# Patient Record
Sex: FEMALE | Race: WHITE | Employment: OTHER | URBAN - METROPOLITAN AREA
[De-identification: names, ages, dates, MRNs, and addresses within clinical notes are randomized per-mention and may not be internally consistent; named-entity substitution may affect disease eponyms.]

---

## 2017-05-31 ENCOUNTER — HOSPITAL ENCOUNTER (EMERGENCY)
Age: 27
Discharge: HOME | End: 2017-05-31
Attending: EMERGENCY MEDICINE
Payer: COMMERCIAL

## 2017-05-31 ENCOUNTER — APPOINTMENT (OUTPATIENT)
Dept: CT IMAGING | Age: 27
End: 2017-05-31
Attending: EMERGENCY MEDICINE
Payer: COMMERCIAL

## 2017-05-31 VITALS
RESPIRATION RATE: 16 BRPM | HEART RATE: 107 BPM | BODY MASS INDEX: 25.34 KG/M2 | OXYGEN SATURATION: 100 % | SYSTOLIC BLOOD PRESSURE: 104 MMHG | DIASTOLIC BLOOD PRESSURE: 59 MMHG | WEIGHT: 143 LBS | TEMPERATURE: 98.1 F | HEIGHT: 63 IN

## 2017-05-31 DIAGNOSIS — G93.5 CHIARI I MALFORMATION (HCC): ICD-10-CM

## 2017-05-31 DIAGNOSIS — R51.9 HEADACHE, UNSPECIFIED HEADACHE TYPE: Primary | ICD-10-CM

## 2017-05-31 PROCEDURE — 99284 EMERGENCY DEPT VISIT MOD MDM: CPT | Performed by: EMERGENCY MEDICINE

## 2017-05-31 PROCEDURE — 74011250636 HC RX REV CODE- 250/636: Performed by: EMERGENCY MEDICINE

## 2017-05-31 PROCEDURE — 70450 CT HEAD/BRAIN W/O DYE: CPT

## 2017-05-31 PROCEDURE — 96374 THER/PROPH/DIAG INJ IV PUSH: CPT | Performed by: EMERGENCY MEDICINE

## 2017-05-31 PROCEDURE — 96375 TX/PRO/DX INJ NEW DRUG ADDON: CPT | Performed by: EMERGENCY MEDICINE

## 2017-05-31 RX ORDER — DIPHENHYDRAMINE HYDROCHLORIDE 50 MG/ML
50 INJECTION, SOLUTION INTRAMUSCULAR; INTRAVENOUS
Status: COMPLETED | OUTPATIENT
Start: 2017-05-31 | End: 2017-05-31

## 2017-05-31 RX ORDER — METOCLOPRAMIDE HYDROCHLORIDE 5 MG/ML
10 INJECTION INTRAMUSCULAR; INTRAVENOUS
Status: COMPLETED | OUTPATIENT
Start: 2017-05-31 | End: 2017-05-31

## 2017-05-31 RX ORDER — SODIUM CHLORIDE 0.9 % (FLUSH) 0.9 %
5-10 SYRINGE (ML) INJECTION AS NEEDED
Status: DISCONTINUED | OUTPATIENT
Start: 2017-05-31 | End: 2017-06-01 | Stop reason: HOSPADM

## 2017-05-31 RX ORDER — ONDANSETRON 2 MG/ML
4 INJECTION INTRAMUSCULAR; INTRAVENOUS
Status: COMPLETED | OUTPATIENT
Start: 2017-05-31 | End: 2017-05-31

## 2017-05-31 RX ORDER — SODIUM CHLORIDE 0.9 % (FLUSH) 0.9 %
5-10 SYRINGE (ML) INJECTION EVERY 8 HOURS
Status: DISCONTINUED | OUTPATIENT
Start: 2017-05-31 | End: 2017-06-01 | Stop reason: HOSPADM

## 2017-05-31 RX ORDER — KETOROLAC TROMETHAMINE 30 MG/ML
30 INJECTION, SOLUTION INTRAMUSCULAR; INTRAVENOUS
Status: COMPLETED | OUTPATIENT
Start: 2017-05-31 | End: 2017-05-31

## 2017-05-31 RX ADMIN — ONDANSETRON 4 MG: 2 INJECTION INTRAMUSCULAR; INTRAVENOUS at 20:12

## 2017-05-31 RX ADMIN — KETOROLAC TROMETHAMINE 30 MG: 30 INJECTION, SOLUTION INTRAMUSCULAR at 19:55

## 2017-05-31 RX ADMIN — DIPHENHYDRAMINE HYDROCHLORIDE 50 MG: 50 INJECTION, SOLUTION INTRAMUSCULAR; INTRAVENOUS at 19:57

## 2017-05-31 RX ADMIN — METOCLOPRAMIDE 10 MG: 5 INJECTION, SOLUTION INTRAMUSCULAR; INTRAVENOUS at 19:56

## 2017-05-31 NOTE — ED NOTES
Talked to Dr. Madina Kong and received verbal order for head CT repeated and confirmed. patient is refusing lab work to be performed at this time.

## 2017-05-31 NOTE — ED TRIAGE NOTES
Patient complaining of head and neck pain that started yesterday, has been off and on since brain surgeries June and July of 2016. Patient advises that she had first due to malformation and second to CSF fluid leak. Patient also with complaints of weakness. Patient advises that procedures were performed at [facility]. Patient with some complaints of nausea.  advises some slurred speech last night, clear at this time.

## 2017-06-01 NOTE — ED PROVIDER NOTES
HPI Comments: Patient with chronic headaches since Chiari I malformation surgery in July 2016. In August 2016 she had surgery for a CSF leak. Chronic intermittent headaches and pain management since that time. Worsening headache over the last couple of days. Denies any fever. Sees Dr. Meggan Merchant at [facility].    Patient is a 27 y.o. female presenting with headaches. The history is provided by the patient. Headache    This is a chronic problem. The current episode started more than 2 days ago. The problem occurs constantly. The problem has not changed since onset. The headache is aggravated by nausea. The quality of the pain is described as throbbing. Associated symptoms include nausea. Pertinent negatives include no fever, no syncope, no shortness of breath, no weakness, no visual change and no vomiting. She has tried nothing for the symptoms. Past Medical History:   Diagnosis Date    Anxiety     ASCUS with positive high risk HPV cervical 2014    Chiari I malformation ([address]        Past Surgical History:   Procedure Laterality Date    HX HEENT      mouth surgery    HX ORTHOPAEDIC      left hand    HX OTHER SURGICAL  6/10/16 & 7/04/16    Brain surgery    HX TONSIL AND ADENOIDECTOMY           Family History:   Problem Relation Age of Onset    Breast Cancer Maternal Grandmother     Heart Disease Maternal Grandmother     Heart Disease Maternal Grandfather     Diabetes Maternal Grandfather     Breast Cancer Paternal Grandmother     Ovarian Cancer Paternal Grandmother     Ovarian Cancer Other      great aunt?  Colon Cancer Neg Hx        Social History     Social History    Marital status:      Spouse name: N/A    Number of children: N/A    Years of education: N/A     Occupational History    Not on file.      Social History Main Topics    Smoking status: Never Smoker    Smokeless tobacco: Never Used    Alcohol use Yes      Comment: occasionally    Drug use: No    Sexual activity: Yes     Partners: Male     Birth control/ protection: None     Other Topics Concern    Not on file     Social History Narrative         ALLERGIES: Amoxicillin; Erythromycin; and Pcn [penicillins]    Review of Systems   Constitutional: Negative for chills and fever. HENT: Negative for congestion, rhinorrhea and sore throat. Eyes: Negative for photophobia and redness. Respiratory: Negative for cough and shortness of breath. Cardiovascular: Negative for chest pain, leg swelling and syncope. Gastrointestinal: Positive for nausea. Negative for abdominal pain, diarrhea and vomiting. Endocrine: Negative for polydipsia and polyuria. Genitourinary: Negative for dysuria. Musculoskeletal: Negative for back pain and myalgias. Neurological: Positive for headaches. Negative for weakness and numbness. Vitals:    05/31/17 1700 05/31/17 1916   BP: 104/59    Pulse: (!) 107    Resp: 16    Temp: 98.1 °F (36.7 °C)    SpO2: 100% 100%   Weight: 64.9 kg (143 lb)    Height: 5' 3\" (1.6 m)             Physical Exam   Constitutional: She is oriented to person, place, and time. She appears well-developed and well-nourished. Eyes: Conjunctivae are normal. Pupils are equal, round, and reactive to light. Neck: Normal range of motion. Neck supple. Cardiovascular: Normal rate, regular rhythm and normal heart sounds. No murmur heard. Pulmonary/Chest: Breath sounds normal. No respiratory distress. Abdominal: Soft. She exhibits no distension. There is no tenderness. There is no rebound and no guarding. Musculoskeletal: Normal range of motion. She exhibits no edema or tenderness. Neurological: She is alert and oriented to person, place, and time. She has normal strength. No cranial nerve deficit or sensory deficit. She exhibits normal muscle tone. Coordination normal. GCS eye subscore is 4. GCS verbal subscore is 5. GCS motor subscore is 6.    Reflex Scores:       Tricep reflexes are 2+ on the right side and 2+ on the left side. Bicep reflexes are 2+ on the right side and 2+ on the left side. Brachioradialis reflexes are 2+ on the right side and 2+ on the left side. Patellar reflexes are 2+ on the right side and 2+ on the left side. Skin: Skin is warm and dry. MDM  Number of Diagnoses or Management Options  Diagnosis management comments: Chronic intermittent headaches with history of Chiari I malformation. CT scan negative. We'll discharge home with instructions for increase fluids and caffeine use in case of CSF leak. Return to G at bedtime if worsening symptoms and follow-up with Dr. Laura Snider when called with appointment. Amount and/or Complexity of Data Reviewed  Discuss the patient with other providers: yes (Discussed with neurosurgery angry [facility].  They will have the patient called and work her in for a follow-up appointment with Dr. Laura Snider. )      ED Course       Procedures

## 2019-08-21 PROBLEM — E66.01 SEVERE OBESITY (HCC): Status: ACTIVE | Noted: 2019-08-21

## 2019-10-15 PROBLEM — Z15.01 BRCA1 GENE MUTATION POSITIVE: Status: ACTIVE | Noted: 2019-10-15

## 2019-10-15 PROBLEM — Z80.3 FAMILY HISTORY OF BREAST CANCER IN FEMALE: Status: ACTIVE | Noted: 2019-10-15

## 2019-10-15 PROBLEM — Z15.09 BRCA1 GENE MUTATION POSITIVE: Status: ACTIVE | Noted: 2019-10-15

## 2019-10-22 ENCOUNTER — HOSPITAL ENCOUNTER (OUTPATIENT)
Dept: MRI IMAGING | Age: 29
Discharge: HOME | End: 2019-10-22
Attending: OBSTETRICS & GYNECOLOGY
Payer: COMMERCIAL

## 2019-10-22 DIAGNOSIS — Z15.09 BRCA1 POSITIVE: ICD-10-CM

## 2019-10-22 DIAGNOSIS — Z80.3 FAMILY HISTORY OF BREAST CANCER: ICD-10-CM

## 2019-10-22 DIAGNOSIS — Z15.01 BRCA1 POSITIVE: ICD-10-CM

## 2019-10-22 DIAGNOSIS — Z80.0 FAMILY HISTORY OF PANCREATIC CANCER: ICD-10-CM

## 2019-10-22 PROCEDURE — 74011000258 HC RX REV CODE- 258: Performed by: OBSTETRICS & GYNECOLOGY

## 2019-10-22 PROCEDURE — 77049 MRI BREAST C-+ W/CAD BI: CPT

## 2019-10-22 PROCEDURE — A9575 INJ GADOTERATE MEGLUMI 0.1ML: HCPCS | Performed by: OBSTETRICS & GYNECOLOGY

## 2019-10-22 PROCEDURE — 74011250636 HC RX REV CODE- 250/636: Performed by: OBSTETRICS & GYNECOLOGY

## 2019-10-22 RX ORDER — GADOTERATE MEGLUMINE 376.9 MG/ML
17 INJECTION INTRAVENOUS
Status: COMPLETED | OUTPATIENT
Start: 2019-10-22 | End: 2019-10-22

## 2019-10-22 RX ORDER — SODIUM CHLORIDE 0.9 % (FLUSH) 0.9 %
10 SYRINGE (ML) INJECTION
Status: COMPLETED | OUTPATIENT
Start: 2019-10-22 | End: 2019-10-22

## 2019-10-22 RX ADMIN — Medication 10 ML: at 10:23

## 2019-10-22 RX ADMIN — SODIUM CHLORIDE 100 ML: 900 INJECTION, SOLUTION INTRAVENOUS at 10:23

## 2019-10-22 RX ADMIN — GADOTERATE MEGLUMINE: 376.9 INJECTION INTRAVENOUS at 10:23

## 2019-11-05 ENCOUNTER — HOSPITAL ENCOUNTER (OUTPATIENT)
Dept: MAMMOGRAPHY | Age: 29
Discharge: HOME | End: 2019-11-05
Payer: COMMERCIAL

## 2019-11-05 DIAGNOSIS — Z15.01 BRCA GENE POSITIVE: ICD-10-CM

## 2019-11-05 DIAGNOSIS — Z12.31 SCREENING MAMMOGRAM, ENCOUNTER FOR: ICD-10-CM

## 2019-11-05 DIAGNOSIS — Z15.09 BRCA GENE POSITIVE: ICD-10-CM

## 2019-11-05 PROCEDURE — 77063 BREAST TOMOSYNTHESIS BI: CPT

## 2019-11-06 ENCOUNTER — HOSPITAL ENCOUNTER (OUTPATIENT)
Dept: LAB | Age: 29
Discharge: HOME | End: 2019-11-06
Payer: COMMERCIAL

## 2019-11-06 DIAGNOSIS — N83.299 OVARIAN CYST, COMPLEX: ICD-10-CM

## 2019-11-06 LAB — CANCER AG125 SERPL-ACNC: 10 U/ML (ref 1.5–35)

## 2019-11-06 PROCEDURE — 36415 COLL VENOUS BLD VENIPUNCTURE: CPT

## 2019-11-06 PROCEDURE — 86304 IMMUNOASSAY TUMOR CA 125: CPT

## 2020-01-16 ENCOUNTER — HOSPITAL ENCOUNTER (OUTPATIENT)
Dept: GENERAL RADIOLOGY | Age: 30
Discharge: HOME | End: 2020-01-16
Payer: COMMERCIAL

## 2020-01-16 DIAGNOSIS — G93.5 CHIARI I MALFORMATION (HCC): ICD-10-CM

## 2020-01-16 PROCEDURE — 72040 X-RAY EXAM NECK SPINE 2-3 VW: CPT

## 2020-01-28 ENCOUNTER — HOSPITAL ENCOUNTER (OUTPATIENT)
Dept: SURGERY | Age: 30
Discharge: HOME | End: 2020-01-28

## 2020-01-29 VITALS — WEIGHT: 189 LBS | BODY MASS INDEX: 32.27 KG/M2 | HEIGHT: 64 IN

## 2020-01-29 RX ORDER — PREGABALIN 150 MG/1
150 CAPSULE ORAL 2 TIMES DAILY
COMMUNITY

## 2020-01-29 RX ORDER — CLONAZEPAM 1 MG/1
1 TABLET ORAL 2 TIMES DAILY
COMMUNITY
Start: 2019-11-13 | End: 2020-05-11

## 2020-01-29 RX ORDER — BACLOFEN 20 MG
1000 TABLET ORAL
COMMUNITY
End: 2020-04-14

## 2020-01-29 RX ORDER — DICLOFENAC POTASSIUM 25 MG/1
25 CAPSULE, LIQUID FILLED ORAL 3 TIMES DAILY
COMMUNITY
End: 2020-02-17 | Stop reason: ALTCHOICE

## 2020-01-29 RX ORDER — BISMUTH SUBSALICYLATE 262 MG
1 TABLET,CHEWABLE ORAL DAILY
COMMUNITY

## 2020-01-29 RX ORDER — ALBUTEROL SULFATE 90 UG/1
2 AEROSOL, METERED RESPIRATORY (INHALATION)
COMMUNITY
End: 2020-10-07

## 2020-01-29 RX ORDER — CETIRIZINE HCL 10 MG
10 TABLET ORAL
COMMUNITY

## 2020-01-29 RX ORDER — LANSOPRAZOLE 30 MG/1
30 CAPSULE, DELAYED RELEASE ORAL
COMMUNITY

## 2020-01-29 RX ORDER — POLYETHYLENE GLYCOL 3350 17 G/17G
17 POWDER, FOR SOLUTION ORAL 2 TIMES DAILY
COMMUNITY

## 2020-01-29 RX ORDER — CHOLECALCIFEROL (VITAMIN D3) 125 MCG
10 CAPSULE ORAL
COMMUNITY
End: 2020-04-14

## 2020-01-29 RX ORDER — BACLOFEN 10 MG/1
10 TABLET ORAL
COMMUNITY

## 2020-01-29 NOTE — PERIOP NOTES
Enhanced Recovery After Surgery: non-diabetic patients    Drink Ensure Enlive - one bottle twice daily for five days starting on 01/30/2020 and stopping on  02/03/2020. Do not drink any Ensure Enlive the day before surgery 02/04/2020. Ensure Enlive  is the preferred formula over other Ensure formulas as it is the only one that  contains CaHMB which helps maintain and rebuild muscle health. It is  recommended that you continue drinking this for one month after surgery. The night before surgery 02/04/2020, drink 2 bottles of the Ensure Pre-Surgery drink. The morning of surgery 02/05/2020, drink one bottle of the Ensure Pre-Surgery drink while on  your way to the hospital. Drink this over 5-10 minutes. Drink nothing else after drinking the pre-surgical drink the morning of surgery. Bring your patient handbook with you to the hospital.      Things to remember:    1. You will be up on a chair the evening of surgery and drinking clear liquids. Your diet will be advanced by your surgeon as appropriate. 2. Beginning the day after surgery, you will be up in a chair for all meals. 3. Beginning the day after surgery, you will be out of the bed for a minimum of 6 hours (not all at one time)    4. Beginning the day after surgery, you will walk in the thomas in the thomas at least 50' at least three times a day. 5. You will be given scheduled non-narcotic pain medication to help keep your pain under control. You will have stronger pain medication ordered for break through pain. 6. All of these measures are geared toward returning your bowel to normal function as soon as possible and to prevent complications associated with bowel blockage, blood clots, and/or pneumonia.

## 2020-01-29 NOTE — PERIOP NOTES
Patient verified name and     Order for consent NOT found in EHR; patient verified. Type 2 surgery phone assessment complete. Labs per surgeon: unknown; orders NOT received. Labs per anesthesia protocol: HGB to be drawn at gyn class. T&S ordered and to be collected dos. EKG: not needed per anesthesia protocols. Patient with history of seizures with last one 2 days ago. States \"sometimes I know I have them and sometimes I don't\". Patient not currently followed by neurology. Has a new patient appointment with Dr. Nabeel Cross ([address]) in March. Was a prior patient to Dr. Darío Mendoza ([facility] Was last seen in 2019. Recently seen Dr. Laquita Barrett (neurosurgery) on 2020 for surgical clearance. Per Dr. Laquita Barrett on 2020: Per Dr. Laquita Barrett who reviewed C-spine films- Normal , no instability- cleared for surgery  vm left at 9:27 on [phone] to call the office  Spoke to patient's  at 9:28 and advised him xrays are normal ,no instability cleared for surgery- Dr. Juanita Cash is able to access Epic records\". Will have anesthesia review chart for extensive medical history and recent seizure. Most recent PCP office note (2020), pain management office note (11/15/19), hematology/oncology office note (19), pulmonary office note (19), neurology office note (19), cardiology office note (01/10/19), echo (10/29/18), and neurosurgery office note (2020) available in EHR for reference if needed. Some documents printed for anesthesia reference/review. Patient states she did not receive education/instructions on pre-surgery drink in surgeons office. Instructed patient that she will received pre-surgery drink with instructions at gyn class. Patient informed of GYN class on 2020 @ 4 pm at which time labs will be drawn.  Patient will also receive all patient education and hospital approved surgical skin cleanser to use per hospital policy. Patient instructed to hold all vitamins 7 days prior to surgery and NSAIDS 5 days prior to surgery, patient verbalized understanding. Patient instructed to continue previous medications as prescribed prior to surgery and to take the following medications the day of surgery according to anesthesia guidelines with a small sip of water: albuterol if needed, baclofen, klonopin, dexilant, cymbalta, prevacid, xtampza, lyrica, trokendi. Instructed to bring: inhaler, motegrity, trokendi. Patient answered medical/surgical history questions at their best of ability. All prior to admission medications documented in [facility].

## 2020-01-30 ENCOUNTER — HOSPITAL ENCOUNTER (OUTPATIENT)
Dept: SURGERY | Age: 30
Discharge: HOME | End: 2020-01-30
Payer: COMMERCIAL

## 2020-01-30 LAB — HGB BLD-MCNC: 15.7 G/DL (ref 11.7–15.4)

## 2020-01-30 PROCEDURE — 36415 COLL VENOUS BLD VENIPUNCTURE: CPT

## 2020-01-30 PROCEDURE — 85018 HEMOGLOBIN: CPT

## 2020-01-30 NOTE — PROGRESS NOTES
Patient attended [facility]/ GYN surgery orientation class today. Detailed instruction book regarding GYN surgery was provided at start of class. Class content included pre-operative instructions for surgery in the week prior to and day before surgery. Packet including Hibiclens and printed instructions on bathing was provided to patient. Detailed and printed diet instruction and presurgical drinks were also given to patient  in accordance with [facility] protocol. Detailed information was given regarding arriving at the hospital and instructions for the patient's day of surgery. Discussed recovery from surgery, hospital stay, pain management, and discharge. Reviewed recovery at home including pelvic rest, driving and activity restrictions, issues requiring call to physician etc. Oli Owen all questions in detail. Patient voices understanding of all.

## 2020-01-31 NOTE — PERIOP NOTES
HGB done 1/30/20 within [facility] protocols.     Recent Results (from the past 24 hour(s))   HEMOGLOBIN    Collection Time: 01/30/20  4:05 PM   Result Value Ref Range    HGB 15.7 (H) 11.7 - 15.4 g/dL

## 2020-02-04 ENCOUNTER — ANESTHESIA EVENT (OUTPATIENT)
Dept: SURGERY | Age: 30
End: 2020-02-04
Payer: COMMERCIAL

## 2020-02-04 NOTE — H&P (VIEW-ONLY)
Gynecology History and Physical 
 
Name: Wilmer Reaves MRN: [id number] SSN: [id number] YOB: 1990  Age: 30 y.o. Sex: female Subjective: Chief complaint:  BRCA-1 positive. Wilmer Morin is a 30 y.o.  female with a history of BRCA-1 positive. \, being admitted for robotic TLH/BSO for risk reduction. No gyn problems. Patient's last menstrual period was 2020 (approximate). Recent pelvic US was WNL and Ca-125 also WNL. OB History   
0 Para Term  AB Living SAB  
   
 TAB Ectopic Molar Multiple Live Births Past Medical History:  
Diagnosis Date  Anemia   
 no supplement  Anxiety  ASCUS with positive high risk HPV cervical   Asthma   
 mild per pulmonary note; rescue inhaler  BMI 32.0-32.9,adult  BRCA1 positive 10/2019  Chiari I malformation ([facility] [address] 3 brain surgeries  Chronic pain  Constipation  Depression  GERD (gastroesophageal reflux disease)   
 on med for control  Heart murmur \"I think I was born with it\"; last echo 10/29/18  Impaired mobility   
 walks with braces, cane, and uses a wheelchair occasionally  Migraine  Multiple falls  Nausea & vomiting   
 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   
 possible stroke during third brain surgery in 2018 Past Surgical History:  
Procedure Laterality Date  HX HEENT    
 dental implants  HX ORTHOPAEDIC    
 left hand  HX OTHER SURGICAL  6/10/16 & 16 & 18 Brain surgery  HX TONSIL AND ADENOIDECTOMY Social History Occupational History  Not on file Tobacco Use  Smoking status: Never Smoker  Smokeless tobacco: Never Used Substance and Sexual Activity  Alcohol use: Yes Comment: occasionally  Drug use: No  
 Sexual activity: Yes  
  Partners: Male Birth control/protection: Condom Family History Problem Relation Age of Onset  Breast Cancer Maternal Grandmother  Heart Disease Maternal Grandmother  Heart Disease Maternal Grandfather  Diabetes Maternal Grandfather  Breast Cancer Paternal Grandmother   
     + BRCA  
 Ovarian Cancer Paternal Grandmother  Cancer Paternal Grandmother   
     pancreatic/liver  Ovarian Cancer Other   
     great aunt?  Breast Cancer Mother  Colon Cancer Neg Hx Allergies Allergen Reactions  Amoxicillin Rash  Erythromycin Rash  Pcn [Penicillins] Rash Prior to Admission medications Medication Sig Start Date End Date Taking? Authorizing Provider  
clonazePAM (KLONOPIN) 1 mg tablet Take 1 mg by mouth two (2) times a day. Take / use AM day of surgery  per anesthesia protocols. 11/13/19 5/11/20 Yes Provider, Historical  
lansoprazole (PREVACID) 30 mg capsule Take 30 mg by mouth Daily (before breakfast). Take / use AM day of surgery  per anesthesia protocols. Yes Provider, Historical  
pregabalin (LYRICA) 150 mg capsule Take 150 mg by mouth two (2) times a day. Take / use AM day of surgery  per anesthesia protocols. Yes Provider, Historical  
topiramate ER (TROKENDI XR) 25 mg capsule Take 25 mg by mouth daily. Take / use AM day of surgery  per anesthesia protocols. Yes Provider, Historical  
diclofenac potassium (ZIPSOR) 25 mg capsule Take 25 mg by mouth three (3) times daily. Indications: pain   Yes Provider, Historical  
prucalopride (MOTEGRITY) 2 mg tab Take 2 mg by mouth daily.    Yes Provider, Historical  
polyethylene glycol (MIRALAX) 17 gram/dose powder Take 17 g by mouth two (2) times a day. Yes Provider, Historical  
BENEFIBER, GUAR GUM, PO Take 3 Tabs by mouth three (3) times daily. Yes Provider, Historical  
melatonin 5 mg tablet Take 10 mg by mouth nightly. Yes Provider, Historical  
magnesium oxide 500 mg tab Take 1,000 mg by mouth nightly. Yes Provider, Historical  
multivitamin (ONE A DAY) tablet Take 1 Tab by mouth daily. Yes Provider, Historical  
baclofen (LIORESAL) 10 mg tablet Take 10 mg by mouth nightly. Yes Provider, Historical  
albuterol (PROAIR HFA) 90 mcg/actuation inhaler Take 2 Puffs by inhalation every six (6) hours as needed for Wheezing. Take / use AM day of surgery  per anesthesia protocols if needed. Indications: asthma attack   Yes Provider, Historical  
cetirizine (ZYRTEC) 10 mg tablet Take 10 mg by mouth nightly. Yes Provider, Historical  
ascorbic acid (VITAMIN C PO) Take 1 Tab by mouth daily. Yes Provider, Historical  
famotidine (PEPCID) 20 mg tablet Take 20 mg by mouth nightly. Yes Provider, Historical  
SUMAtriptan (IMITREX) 50 mg tablet Take 50 mg by mouth once as needed for Migraine. Yes Provider, Historical  
baclofen 5 mg tab Take 5 mg by mouth two (2) times a day. Take / use AM day of surgery  per anesthesia protocols. Yes Provider, Historical  
DULoxetine (CYMBALTA) 60 mg capsule Take 60 mg by mouth two (2) times a day. Take / use AM day of surgery  per anesthesia protocols. Yes Provider, Historical  
docusate sodium (COLACE) 100 mg capsule Take 100 mg by mouth three (3) times daily. Yes Provider, Historical  
naloxegol (MOVANTIK) 25 mg tab tablet Take  by mouth Daily (before breakfast). Yes Provider, Historical  
oxyCODONE ER (XTAMPZA ER) 18 mg ER capsule Take 18 mg by mouth every twelve (12) hours. Take / use AM day of surgery  per anesthesia protocols. Yes Provider, Historical  
celecoxib (CELEBREX) 200 mg capsule Take 200 mg by mouth two (2) times a day.    Yes Provider, Historical  
 cyanocobalamin 1,000 mcg tablet Take 1,000 mcg by mouth daily. Yes Provider, Historical  
  
 
Review of Systems: A comprehensive review of systems was negative except for that written in the History of Present Illness. Objective:  
 
Vitals:  
 01/30/20 1221 BP: 128/80 Weight: 196 lb (88.9 kg) Height: 5' 4\" (1.626 m) Physical Exam: 
Patient without distress. Heart: Regular rate and rhythm Lung: clear to auscultation throughout lung fields, no wheezes, no rales, no rhonchi and normal respiratory effort Abdomen: soft, nontender Pelvic exam per Dr Tanya Hill was unremarkable Assessment:  
 
Active Problems:BRCA-1 gene mutation positive Plan:  
 
Admit for Robotic TLH/BSO Discussed the risks of surgery including the risks of bleeding, infection, deep vein thrombosis, and surgical injuries to internal organs including but not limited to the bowels, bladder, ureters rectum, and female reproductive organs. Has a h/o neurologic complications following surgery including stroke and paralysis, has been in OT/PT. Has been cleared by Neurosurgery (Dr. Gerardo Woodward.) The patient understands the risks; Also understands will have surgical menopause, will not be able to use HRT at least until mastectomy. Understands other options eg herbal supplements, b-blockers, low dose anti-depressants may or may not be effective. Any and all questions were answered to the patient's satisfaction.

## 2020-02-05 ENCOUNTER — HOSPITAL ENCOUNTER (OUTPATIENT)
Age: 30
Discharge: HOME | End: 2020-02-07
Attending: OBSTETRICS & GYNECOLOGY | Admitting: OBSTETRICS & GYNECOLOGY
Payer: COMMERCIAL

## 2020-02-05 ENCOUNTER — ANESTHESIA (OUTPATIENT)
Dept: SURGERY | Age: 30
End: 2020-02-05
Payer: COMMERCIAL

## 2020-02-05 DIAGNOSIS — G89.18 POSTOPERATIVE PAIN: Primary | ICD-10-CM

## 2020-02-05 PROBLEM — Z15.01 BRCA1 POSITIVE: Status: ACTIVE | Noted: 2020-02-05

## 2020-02-05 PROBLEM — Z15.09 BRCA1 POSITIVE: Status: ACTIVE | Noted: 2020-02-05

## 2020-02-05 LAB
ABO + RH BLD: NORMAL
BLOOD GROUP ANTIBODIES SERPL: NORMAL
HCG UR QL: NEGATIVE
SPECIMEN EXP DATE BLD: NORMAL

## 2020-02-05 PROCEDURE — 77030016151 HC PROTCTR LNS DFOG COVD -B: Performed by: OBSTETRICS & GYNECOLOGY

## 2020-02-05 PROCEDURE — 81025 URINE PREGNANCY TEST: CPT

## 2020-02-05 PROCEDURE — 77030008756 HC TU IRR SUC STRY -B: Performed by: OBSTETRICS & GYNECOLOGY

## 2020-02-05 PROCEDURE — 88307 TISSUE EXAM BY PATHOLOGIST: CPT

## 2020-02-05 PROCEDURE — 77030022704 HC SUT VLOC COVD -B: Performed by: OBSTETRICS & GYNECOLOGY

## 2020-02-05 PROCEDURE — 77030039425 HC BLD LARYNG TRULITE DISP TELE -A: Performed by: ANESTHESIOLOGY

## 2020-02-05 PROCEDURE — 77030034696 HC CATH URETH FOL 2W BARD -A: Performed by: OBSTETRICS & GYNECOLOGY

## 2020-02-05 PROCEDURE — 86900 BLOOD TYPING SEROLOGIC ABO: CPT

## 2020-02-05 PROCEDURE — 74011250636 HC RX REV CODE- 250/636: Performed by: ANESTHESIOLOGY

## 2020-02-05 PROCEDURE — 77030040361 HC SLV COMPR DVT MDII -B: Performed by: OBSTETRICS & GYNECOLOGY

## 2020-02-05 PROCEDURE — 77030035279 HC SEAL VSL ENDOWR XI INTU -I2: Performed by: OBSTETRICS & GYNECOLOGY

## 2020-02-05 PROCEDURE — 74011250637 HC RX REV CODE- 250/637: Performed by: STUDENT IN AN ORGANIZED HEALTH CARE EDUCATION/TRAINING PROGRAM

## 2020-02-05 PROCEDURE — 77030018836 HC SOL IRR NACL ICUM -A: Performed by: OBSTETRICS & GYNECOLOGY

## 2020-02-05 PROCEDURE — 77030010545: Performed by: OBSTETRICS & GYNECOLOGY

## 2020-02-05 PROCEDURE — 76210000016 HC OR PH I REC 1 TO 1.5 HR: Performed by: OBSTETRICS & GYNECOLOGY

## 2020-02-05 PROCEDURE — 77030037088 HC TUBE ENDOTRACH ORAL NSL COVD-A: Performed by: ANESTHESIOLOGY

## 2020-02-05 PROCEDURE — 77030027744 HC PWDR HEMSTAT ARISTA ABSRB 5GM BARD -D: Performed by: OBSTETRICS & GYNECOLOGY

## 2020-02-05 PROCEDURE — 77030031492 HC PRT ACC BLNT AIRSEAL CNMD -B: Performed by: OBSTETRICS & GYNECOLOGY

## 2020-02-05 PROCEDURE — 74011000250 HC RX REV CODE- 250: Performed by: OBSTETRICS & GYNECOLOGY

## 2020-02-05 PROCEDURE — 74011250636 HC RX REV CODE- 250/636: Performed by: STUDENT IN AN ORGANIZED HEALTH CARE EDUCATION/TRAINING PROGRAM

## 2020-02-05 PROCEDURE — 77030027743 HC APPL F/HEMSTAT BARD -B: Performed by: OBSTETRICS & GYNECOLOGY

## 2020-02-05 PROCEDURE — 76010000878 HC OR TIME 3 TO 3.5HR INTENSV - TIER 2: Performed by: OBSTETRICS & GYNECOLOGY

## 2020-02-05 PROCEDURE — 74011000250 HC RX REV CODE- 250: Performed by: STUDENT IN AN ORGANIZED HEALTH CARE EDUCATION/TRAINING PROGRAM

## 2020-02-05 PROCEDURE — 77030010507 HC ADH SKN DERMBND J&J -B: Performed by: OBSTETRICS & GYNECOLOGY

## 2020-02-05 PROCEDURE — 77030040922 HC BLNKT HYPOTHRM STRY -A: Performed by: ANESTHESIOLOGY

## 2020-02-05 PROCEDURE — 77030019927 HC TBNG IRR CYSTO BAXT -A: Performed by: OBSTETRICS & GYNECOLOGY

## 2020-02-05 PROCEDURE — 77030003578 HC NDL INSUF VERES AMR -B: Performed by: OBSTETRICS & GYNECOLOGY

## 2020-02-05 PROCEDURE — 74011250636 HC RX REV CODE- 250/636: Performed by: OBSTETRICS & GYNECOLOGY

## 2020-02-05 PROCEDURE — 77030031139 HC SUT VCRL2 J&J -A: Performed by: OBSTETRICS & GYNECOLOGY

## 2020-02-05 PROCEDURE — 77030018846 HC SOL IRR STRL H20 ICUM -A: Performed by: OBSTETRICS & GYNECOLOGY

## 2020-02-05 PROCEDURE — 77030020703 HC SEAL CANN DISP INTU -B: Performed by: OBSTETRICS & GYNECOLOGY

## 2020-02-05 PROCEDURE — C2628 CATHETER, OCCLUSION: HCPCS | Performed by: OBSTETRICS & GYNECOLOGY

## 2020-02-05 PROCEDURE — 74011000250 HC RX REV CODE- 250: Performed by: ANESTHESIOLOGY

## 2020-02-05 PROCEDURE — 76060000037 HC ANESTHESIA 3 TO 3.5 HR: Performed by: OBSTETRICS & GYNECOLOGY

## 2020-02-05 PROCEDURE — 77030019908 HC STETH ESOPH SIMS -A: Performed by: ANESTHESIOLOGY

## 2020-02-05 PROCEDURE — 74011250636 HC RX REV CODE- 250/636

## 2020-02-05 PROCEDURE — 77030035277 HC OBTRTR BLDELSS DISP INTU -B: Performed by: OBSTETRICS & GYNECOLOGY

## 2020-02-05 PROCEDURE — 74011000250 HC RX REV CODE- 250: Performed by: NURSE ANESTHETIST, CERTIFIED REGISTERED

## 2020-02-05 PROCEDURE — 74011250636 HC RX REV CODE- 250/636: Performed by: NURSE ANESTHETIST, CERTIFIED REGISTERED

## 2020-02-05 PROCEDURE — 77030003029 HC SUT VCRL J&J -B: Performed by: OBSTETRICS & GYNECOLOGY

## 2020-02-05 PROCEDURE — 74011250637 HC RX REV CODE- 250/637: Performed by: ANESTHESIOLOGY

## 2020-02-05 PROCEDURE — 77030018832 HC SOL IRR H20 ICUM -A: Performed by: OBSTETRICS & GYNECOLOGY

## 2020-02-05 PROCEDURE — 74011250637 HC RX REV CODE- 250/637: Performed by: OBSTETRICS & GYNECOLOGY

## 2020-02-05 PROCEDURE — 74011000250 HC RX REV CODE- 250

## 2020-02-05 RX ORDER — NEOSTIGMINE METHYLSULFATE 1 MG/ML
INJECTION, SOLUTION INTRAVENOUS AS NEEDED
Status: DISCONTINUED | OUTPATIENT
Start: 2020-02-05 | End: 2020-02-05 | Stop reason: HOSPADM

## 2020-02-05 RX ORDER — FENTANYL CITRATE 50 UG/ML
INJECTION, SOLUTION INTRAMUSCULAR; INTRAVENOUS AS NEEDED
Status: DISCONTINUED | OUTPATIENT
Start: 2020-02-05 | End: 2020-02-05 | Stop reason: HOSPADM

## 2020-02-05 RX ORDER — ALBUTEROL SULFATE 0.83 MG/ML
2.5 SOLUTION RESPIRATORY (INHALATION)
Status: DISCONTINUED | OUTPATIENT
Start: 2020-02-05 | End: 2020-02-07 | Stop reason: HOSPADM

## 2020-02-05 RX ORDER — FAMOTIDINE 20 MG/1
20 TABLET, FILM COATED ORAL
Status: DISCONTINUED | OUTPATIENT
Start: 2020-02-05 | End: 2020-02-07 | Stop reason: HOSPADM

## 2020-02-05 RX ORDER — DOCUSATE SODIUM 100 MG/1
100 CAPSULE, LIQUID FILLED ORAL 3 TIMES DAILY
Status: DISCONTINUED | OUTPATIENT
Start: 2020-02-05 | End: 2020-02-06

## 2020-02-05 RX ORDER — BUPIVACAINE HYDROCHLORIDE 5 MG/ML
INJECTION, SOLUTION EPIDURAL; INTRACAUDAL AS NEEDED
Status: DISCONTINUED | OUTPATIENT
Start: 2020-02-05 | End: 2020-02-05 | Stop reason: HOSPADM

## 2020-02-05 RX ORDER — PROPOFOL 10 MG/ML
INJECTION, EMULSION INTRAVENOUS AS NEEDED
Status: DISCONTINUED | OUTPATIENT
Start: 2020-02-05 | End: 2020-02-05 | Stop reason: HOSPADM

## 2020-02-05 RX ORDER — NALOXONE HYDROCHLORIDE 0.4 MG/ML
0.2 INJECTION, SOLUTION INTRAMUSCULAR; INTRAVENOUS; SUBCUTANEOUS AS NEEDED
Status: DISCONTINUED | OUTPATIENT
Start: 2020-02-05 | End: 2020-02-05 | Stop reason: HOSPADM

## 2020-02-05 RX ORDER — GABAPENTIN 300 MG/1
300 CAPSULE ORAL ONCE
Status: DISCONTINUED | OUTPATIENT
Start: 2020-02-05 | End: 2020-02-05 | Stop reason: HOSPADM

## 2020-02-05 RX ORDER — KETOROLAC TROMETHAMINE 30 MG/ML
30 INJECTION, SOLUTION INTRAMUSCULAR; INTRAVENOUS EVERY 6 HOURS
Status: COMPLETED | OUTPATIENT
Start: 2020-02-05 | End: 2020-02-06

## 2020-02-05 RX ORDER — SODIUM CHLORIDE, SODIUM LACTATE, POTASSIUM CHLORIDE, CALCIUM CHLORIDE 600; 310; 30; 20 MG/100ML; MG/100ML; MG/100ML; MG/100ML
75 INJECTION, SOLUTION INTRAVENOUS CONTINUOUS
Status: DISCONTINUED | OUTPATIENT
Start: 2020-02-05 | End: 2020-02-05 | Stop reason: HOSPADM

## 2020-02-05 RX ORDER — PREGABALIN 150 MG/1
150 CAPSULE ORAL 2 TIMES DAILY
Status: DISCONTINUED | OUTPATIENT
Start: 2020-02-05 | End: 2020-02-07 | Stop reason: HOSPADM

## 2020-02-05 RX ORDER — ONDANSETRON 4 MG/1
4 TABLET, ORALLY DISINTEGRATING ORAL
Status: DISCONTINUED | OUTPATIENT
Start: 2020-02-05 | End: 2020-02-07 | Stop reason: HOSPADM

## 2020-02-05 RX ORDER — CELECOXIB 100 MG/1
100 CAPSULE ORAL 2 TIMES DAILY
Status: DISCONTINUED | OUTPATIENT
Start: 2020-02-06 | End: 2020-02-05 | Stop reason: SDUPTHER

## 2020-02-05 RX ORDER — CEFAZOLIN SODIUM/WATER 2 G/20 ML
2 SYRINGE (ML) INTRAVENOUS ONCE
Status: COMPLETED | OUTPATIENT
Start: 2020-02-05 | End: 2020-02-05

## 2020-02-05 RX ORDER — BACLOFEN 10 MG/1
10 TABLET ORAL
Status: DISCONTINUED | OUTPATIENT
Start: 2020-02-05 | End: 2020-02-07 | Stop reason: HOSPADM

## 2020-02-05 RX ORDER — HYDROMORPHONE HYDROCHLORIDE 1 MG/ML
1 INJECTION, SOLUTION INTRAMUSCULAR; INTRAVENOUS; SUBCUTANEOUS
Status: DISCONTINUED | OUTPATIENT
Start: 2020-02-05 | End: 2020-02-06

## 2020-02-05 RX ORDER — SODIUM CHLORIDE 9 MG/ML
INJECTION, SOLUTION INTRAVENOUS
Status: DISCONTINUED | OUTPATIENT
Start: 2020-02-05 | End: 2020-02-05 | Stop reason: HOSPADM

## 2020-02-05 RX ORDER — LIDOCAINE HYDROCHLORIDE 20 MG/ML
INJECTION, SOLUTION EPIDURAL; INFILTRATION; INTRACAUDAL; PERINEURAL AS NEEDED
Status: DISCONTINUED | OUTPATIENT
Start: 2020-02-05 | End: 2020-02-05 | Stop reason: HOSPADM

## 2020-02-05 RX ORDER — MIDAZOLAM HYDROCHLORIDE 1 MG/ML
INJECTION, SOLUTION INTRAMUSCULAR; INTRAVENOUS AS NEEDED
Status: DISCONTINUED | OUTPATIENT
Start: 2020-02-05 | End: 2020-02-05 | Stop reason: HOSPADM

## 2020-02-05 RX ORDER — LORATADINE 10 MG/1
10 TABLET ORAL DAILY
Status: DISCONTINUED | OUTPATIENT
Start: 2020-02-06 | End: 2020-02-07 | Stop reason: HOSPADM

## 2020-02-05 RX ORDER — MIDAZOLAM HYDROCHLORIDE 1 MG/ML
2 INJECTION, SOLUTION INTRAMUSCULAR; INTRAVENOUS
Status: DISCONTINUED | OUTPATIENT
Start: 2020-02-05 | End: 2020-02-05 | Stop reason: HOSPADM

## 2020-02-05 RX ORDER — ACETAMINOPHEN 500 MG
1000 TABLET ORAL EVERY 6 HOURS
Status: DISCONTINUED | OUTPATIENT
Start: 2020-02-05 | End: 2020-02-07 | Stop reason: HOSPADM

## 2020-02-05 RX ORDER — CLONAZEPAM 1 MG/1
1 TABLET ORAL 2 TIMES DAILY
Status: DISCONTINUED | OUTPATIENT
Start: 2020-02-05 | End: 2020-02-07 | Stop reason: HOSPADM

## 2020-02-05 RX ORDER — OXYCODONE HCL 20 MG/1
20 TABLET, FILM COATED, EXTENDED RELEASE ORAL EVERY 12 HOURS
Status: DISCONTINUED | OUTPATIENT
Start: 2020-02-05 | End: 2020-02-06

## 2020-02-05 RX ORDER — APREPITANT 40 MG/1
40 CAPSULE ORAL ONCE
Status: COMPLETED | OUTPATIENT
Start: 2020-02-05 | End: 2020-02-05

## 2020-02-05 RX ORDER — OXYCODONE HYDROCHLORIDE 5 MG/1
5-10 TABLET ORAL
Status: DISCONTINUED | OUTPATIENT
Start: 2020-02-05 | End: 2020-02-06

## 2020-02-05 RX ORDER — LIDOCAINE HYDROCHLORIDE ANHYDROUS AND DEXTROSE MONOHYDRATE .4; 5 G/100ML; G/100ML
1 INJECTION, SOLUTION INTRAVENOUS CONTINUOUS
Status: DISCONTINUED | OUTPATIENT
Start: 2020-02-05 | End: 2020-02-05 | Stop reason: HOSPADM

## 2020-02-05 RX ORDER — PANTOPRAZOLE SODIUM 40 MG/1
40 TABLET, DELAYED RELEASE ORAL
Status: DISCONTINUED | OUTPATIENT
Start: 2020-02-06 | End: 2020-02-07 | Stop reason: HOSPADM

## 2020-02-05 RX ORDER — OXYCODONE HYDROCHLORIDE 5 MG/1
10 TABLET ORAL
Status: DISCONTINUED | OUTPATIENT
Start: 2020-02-05 | End: 2020-02-06

## 2020-02-05 RX ORDER — LANOLIN ALCOHOL/MO/W.PET/CERES
400 CREAM (GRAM) TOPICAL
Status: DISCONTINUED | OUTPATIENT
Start: 2020-02-05 | End: 2020-02-07 | Stop reason: HOSPADM

## 2020-02-05 RX ORDER — GLYCOPYRROLATE 0.2 MG/ML
INJECTION INTRAMUSCULAR; INTRAVENOUS AS NEEDED
Status: DISCONTINUED | OUTPATIENT
Start: 2020-02-05 | End: 2020-02-05 | Stop reason: HOSPADM

## 2020-02-05 RX ORDER — ONDANSETRON 2 MG/ML
INJECTION INTRAMUSCULAR; INTRAVENOUS AS NEEDED
Status: DISCONTINUED | OUTPATIENT
Start: 2020-02-05 | End: 2020-02-05 | Stop reason: HOSPADM

## 2020-02-05 RX ORDER — ACETAMINOPHEN 500 MG
1000 TABLET ORAL ONCE
Status: COMPLETED | OUTPATIENT
Start: 2020-02-05 | End: 2020-02-05

## 2020-02-05 RX ORDER — FENTANYL CITRATE 50 UG/ML
100 INJECTION, SOLUTION INTRAMUSCULAR; INTRAVENOUS ONCE
Status: DISCONTINUED | OUTPATIENT
Start: 2020-02-05 | End: 2020-02-05 | Stop reason: HOSPADM

## 2020-02-05 RX ORDER — OXYCODONE HYDROCHLORIDE 5 MG/1
5 TABLET ORAL
Status: DISCONTINUED | OUTPATIENT
Start: 2020-02-05 | End: 2020-02-05 | Stop reason: HOSPADM

## 2020-02-05 RX ORDER — CELECOXIB 200 MG/1
200 CAPSULE ORAL 2 TIMES DAILY
Status: DISCONTINUED | OUTPATIENT
Start: 2020-02-05 | End: 2020-02-07 | Stop reason: HOSPADM

## 2020-02-05 RX ORDER — KETAMINE HYDROCHLORIDE 100 MG/ML
INJECTION, SOLUTION INTRAMUSCULAR; INTRAVENOUS AS NEEDED
Status: DISCONTINUED | OUTPATIENT
Start: 2020-02-05 | End: 2020-02-05 | Stop reason: HOSPADM

## 2020-02-05 RX ORDER — POLYETHYLENE GLYCOL 3350 17 G/17G
17 POWDER, FOR SOLUTION ORAL 2 TIMES DAILY
Status: DISCONTINUED | OUTPATIENT
Start: 2020-02-05 | End: 2020-02-07 | Stop reason: HOSPADM

## 2020-02-05 RX ORDER — DULOXETIN HYDROCHLORIDE 60 MG/1
60 CAPSULE, DELAYED RELEASE ORAL 2 TIMES DAILY
Status: DISCONTINUED | OUTPATIENT
Start: 2020-02-05 | End: 2020-02-07 | Stop reason: HOSPADM

## 2020-02-05 RX ORDER — KETOROLAC TROMETHAMINE 30 MG/ML
INJECTION, SOLUTION INTRAMUSCULAR; INTRAVENOUS AS NEEDED
Status: DISCONTINUED | OUTPATIENT
Start: 2020-02-05 | End: 2020-02-05 | Stop reason: HOSPADM

## 2020-02-05 RX ORDER — HYDROMORPHONE HYDROCHLORIDE 2 MG/ML
0.5 INJECTION, SOLUTION INTRAMUSCULAR; INTRAVENOUS; SUBCUTANEOUS
Status: COMPLETED | OUTPATIENT
Start: 2020-02-05 | End: 2020-02-05

## 2020-02-05 RX ORDER — SUMATRIPTAN 50 MG/1
50 TABLET, FILM COATED ORAL
Status: DISCONTINUED | OUTPATIENT
Start: 2020-02-05 | End: 2020-02-07 | Stop reason: HOSPADM

## 2020-02-05 RX ORDER — MIDAZOLAM HYDROCHLORIDE 1 MG/ML
2 INJECTION, SOLUTION INTRAMUSCULAR; INTRAVENOUS ONCE
Status: COMPLETED | OUTPATIENT
Start: 2020-02-05 | End: 2020-02-05

## 2020-02-05 RX ORDER — LIDOCAINE HYDROCHLORIDE 10 MG/ML
0.1 INJECTION INFILTRATION; PERINEURAL AS NEEDED
Status: DISCONTINUED | OUTPATIENT
Start: 2020-02-05 | End: 2020-02-05 | Stop reason: HOSPADM

## 2020-02-05 RX ORDER — ROCURONIUM BROMIDE 10 MG/ML
INJECTION, SOLUTION INTRAVENOUS AS NEEDED
Status: DISCONTINUED | OUTPATIENT
Start: 2020-02-05 | End: 2020-02-05 | Stop reason: HOSPADM

## 2020-02-05 RX ORDER — BACLOFEN 10 MG/1
5 TABLET ORAL 2 TIMES DAILY
Status: DISCONTINUED | OUTPATIENT
Start: 2020-02-06 | End: 2020-02-07 | Stop reason: HOSPADM

## 2020-02-05 RX ORDER — NALOXONE HYDROCHLORIDE 0.4 MG/ML
0.4 INJECTION, SOLUTION INTRAMUSCULAR; INTRAVENOUS; SUBCUTANEOUS AS NEEDED
Status: DISCONTINUED | OUTPATIENT
Start: 2020-02-05 | End: 2020-02-07 | Stop reason: HOSPADM

## 2020-02-05 RX ORDER — ENOXAPARIN SODIUM 100 MG/ML
40 INJECTION SUBCUTANEOUS ONCE
Status: COMPLETED | OUTPATIENT
Start: 2020-02-05 | End: 2020-02-05

## 2020-02-05 RX ORDER — SODIUM CHLORIDE, SODIUM LACTATE, POTASSIUM CHLORIDE, CALCIUM CHLORIDE 600; 310; 30; 20 MG/100ML; MG/100ML; MG/100ML; MG/100ML
40 INJECTION, SOLUTION INTRAVENOUS CONTINUOUS
Status: DISPENSED | OUTPATIENT
Start: 2020-02-05 | End: 2020-02-06

## 2020-02-05 RX ADMIN — FAMOTIDINE 20 MG: 20 TABLET, FILM COATED ORAL at 22:19

## 2020-02-05 RX ADMIN — SODIUM CHLORIDE: 9 INJECTION, SOLUTION INTRAVENOUS at 13:35

## 2020-02-05 RX ADMIN — Medication 2 G: at 13:31

## 2020-02-05 RX ADMIN — MINERAL OIL AND WHITE PETROLATUM 1 EACH: 150; 830 OINTMENT OPHTHALMIC at 13:50

## 2020-02-05 RX ADMIN — MIDAZOLAM 2 MG: 1 INJECTION INTRAMUSCULAR; INTRAVENOUS at 13:25

## 2020-02-05 RX ADMIN — ROCURONIUM BROMIDE 10 MG: 10 INJECTION, SOLUTION INTRAVENOUS at 15:29

## 2020-02-05 RX ADMIN — ACETAMINOPHEN 1000 MG: 500 TABLET, FILM COATED ORAL at 22:19

## 2020-02-05 RX ADMIN — HYDROMORPHONE HYDROCHLORIDE 0.5 MG: 2 INJECTION INTRAMUSCULAR; INTRAVENOUS; SUBCUTANEOUS at 17:02

## 2020-02-05 RX ADMIN — Medication 400 MG: at 22:19

## 2020-02-05 RX ADMIN — HYDROMORPHONE HYDROCHLORIDE 0.5 MG: 2 INJECTION INTRAMUSCULAR; INTRAVENOUS; SUBCUTANEOUS at 17:20

## 2020-02-05 RX ADMIN — MIDAZOLAM 2 MG: 1 INJECTION INTRAMUSCULAR; INTRAVENOUS at 12:46

## 2020-02-05 RX ADMIN — SODIUM CHLORIDE, SODIUM LACTATE, POTASSIUM CHLORIDE, AND CALCIUM CHLORIDE 25 ML/HR: 600; 310; 30; 20 INJECTION, SOLUTION INTRAVENOUS at 12:09

## 2020-02-05 RX ADMIN — BACLOFEN 10 MG: 10 TABLET ORAL at 22:18

## 2020-02-05 RX ADMIN — PROPOFOL 170 MG: 10 INJECTION, EMULSION INTRAVENOUS at 13:42

## 2020-02-05 RX ADMIN — SODIUM CHLORIDE, SODIUM LACTATE, POTASSIUM CHLORIDE, AND CALCIUM CHLORIDE 40 ML/HR: 600; 310; 30; 20 INJECTION, SOLUTION INTRAVENOUS at 19:00

## 2020-02-05 RX ADMIN — GLYCOPYRROLATE 0.4 MG: 0.2 INJECTION, SOLUTION INTRAMUSCULAR; INTRAVENOUS at 16:14

## 2020-02-05 RX ADMIN — KETOROLAC TROMETHAMINE 30 MG: 30 INJECTION, SOLUTION INTRAMUSCULAR at 21:04

## 2020-02-05 RX ADMIN — KETAMINE HYDROCHLORIDE 40 MG: 100 INJECTION, SOLUTION INTRAMUSCULAR; INTRAVENOUS at 13:42

## 2020-02-05 RX ADMIN — OXYCODONE HYDROCHLORIDE 20 MG: 20 TABLET, FILM COATED, EXTENDED RELEASE ORAL at 21:04

## 2020-02-05 RX ADMIN — LIDOCAINE HYDROCHLORIDE 0.1 ML: 10 INJECTION, SOLUTION INFILTRATION; PERINEURAL at 12:47

## 2020-02-05 RX ADMIN — CLONAZEPAM 1 MG: 1 TABLET ORAL at 22:18

## 2020-02-05 RX ADMIN — CELECOXIB 200 MG: 200 CAPSULE ORAL at 21:04

## 2020-02-05 RX ADMIN — ONDANSETRON 4 MG: 2 INJECTION INTRAMUSCULAR; INTRAVENOUS at 15:52

## 2020-02-05 RX ADMIN — ROCURONIUM BROMIDE 50 MG: 10 INJECTION, SOLUTION INTRAVENOUS at 13:42

## 2020-02-05 RX ADMIN — LIDOCAINE HYDROCHLORIDE 60 MG: 20 INJECTION, SOLUTION EPIDURAL; INFILTRATION; INTRACAUDAL; PERINEURAL at 13:42

## 2020-02-05 RX ADMIN — ACETAMINOPHEN 1000 MG: 500 TABLET, FILM COATED ORAL at 12:45

## 2020-02-05 RX ADMIN — PREGABALIN 150 MG: 150 CAPSULE ORAL at 22:19

## 2020-02-05 RX ADMIN — FENTANYL CITRATE 100 MCG: 50 INJECTION INTRAMUSCULAR; INTRAVENOUS at 13:42

## 2020-02-05 RX ADMIN — Medication 3 MG: at 16:14

## 2020-02-05 RX ADMIN — ROCURONIUM BROMIDE 10 MG: 10 INJECTION, SOLUTION INTRAVENOUS at 14:51

## 2020-02-05 RX ADMIN — APREPITANT 40 MG: 40 CAPSULE ORAL at 12:45

## 2020-02-05 RX ADMIN — KETAMINE HYDROCHLORIDE 45 MG: 100 INJECTION, SOLUTION INTRAMUSCULAR; INTRAVENOUS at 15:41

## 2020-02-05 RX ADMIN — KETOROLAC TROMETHAMINE 30 MG: 30 INJECTION, SOLUTION INTRAMUSCULAR; INTRAVENOUS at 15:28

## 2020-02-05 RX ADMIN — ROCURONIUM BROMIDE 10 MG: 10 INJECTION, SOLUTION INTRAVENOUS at 15:08

## 2020-02-05 RX ADMIN — DOCUSATE SODIUM 100 MG: 100 CAPSULE, LIQUID FILLED ORAL at 22:19

## 2020-02-05 RX ADMIN — ENOXAPARIN SODIUM 40 MG: 40 INJECTION SUBCUTANEOUS at 12:57

## 2020-02-05 RX ADMIN — DULOXETINE HYDROCHLORIDE 60 MG: 60 CAPSULE, DELAYED RELEASE ORAL at 22:18

## 2020-02-05 RX ADMIN — PROMETHAZINE HYDROCHLORIDE 6.25 MG: 25 INJECTION INTRAMUSCULAR; INTRAVENOUS at 17:28

## 2020-02-05 RX ADMIN — SODIUM CHLORIDE: 9 INJECTION, SOLUTION INTRAVENOUS at 14:07

## 2020-02-05 RX ADMIN — KETAMINE HYDROCHLORIDE 5 MG: 100 INJECTION, SOLUTION INTRAMUSCULAR; INTRAVENOUS at 15:17

## 2020-02-05 NOTE — ANESTHESIA POSTPROCEDURE EVALUATION
Procedure(s):  ROBOTIC ASSISTED HYSTERECTOMY TOTAL LAPAROSCOPIC WITH BILATERAL SALPINGO-OOPHORECTOMY/ERAS; CYSTOSCOPY.     general    Anesthesia Post Evaluation      Multimodal analgesia: multimodal analgesia used between 6 hours prior to anesthesia start to PACU discharge  Patient location during evaluation: PACU  Patient participation: complete - patient participated  Level of consciousness: awake and alert  Pain management: adequate  Airway patency: patent  Anesthetic complications: no  Cardiovascular status: acceptable  Respiratory status: acceptable  Hydration status: acceptable  Post anesthesia nausea and vomiting:  none      Vitals Value Taken Time   /56 2/5/2020  5:30 PM   Temp 38.2 °C (100.8 °F) 2/5/2020  4:37 PM   Pulse 102 2/5/2020  5:30 PM   Resp 18 2/5/2020  5:30 PM   SpO2 100 % 2/5/2020  5:30 PM

## 2020-02-05 NOTE — PERIOP NOTES
TRANSFER - OUT REPORT:    Verbal report given to Alfonso Flowers on [address]  being transferred to Med/Surg [phone] for routine post - op       Report consisted of patients Situation, Background, Assessment and   Recommendations(SBAR). Information from the following report(s) SBAR, Kardex, Procedure Summary, Intake/Output and MAR was reviewed with the receiving nurse. Lines:   Peripheral IV 02/05/20 Right Hand (Active)   Site Assessment Clean, dry, & intact 2/5/2020  5:00 PM   Phlebitis Assessment 0 2/5/2020  5:00 PM   Infiltration Assessment 0 2/5/2020  5:00 PM   Dressing Status Clean, dry, & intact 2/5/2020  5:00 PM   Dressing Type Tape;Transparent 2/5/2020  5:00 PM   Hub Color/Line Status Pink;Patent; Infusing 2/5/2020  5:00 PM        Opportunity for questions and clarification was provided.       Patient transported with:   O2 @ 3 liters; patient chart; IV fluids; SCDs

## 2020-02-05 NOTE — PROGRESS NOTES
TRANSFER - IN REPORT:    Verbal report received from Harish Marie (name) on [address]  being received from PACU (unit) for routine post - op      Report consisted of patients Situation, Background, Assessment and   Recommendations(SBAR). Information from the following report(s) SBAR, Kardex, Procedure Summary, MAR and Recent Results was reviewed with the receiving nurse. Opportunity for questions and clarification was provided. Assessment completed upon patients arrival to unit and care assumed.

## 2020-02-05 NOTE — ANESTHESIA PREPROCEDURE EVALUATION
Relevant Problems   No relevant active problems       Anesthetic History     PONV          Review of Systems / Medical History  Patient summary reviewed and pertinent labs reviewed    Pulmonary  Within defined limits              Comments: Normal PFTs 2/2019   Neuro/Psych     seizures (Pseudoseizures)    Psychiatric history    Comments: H/o Chiari Type 1 malformation and has had 3 surgeries for this. Most recently was apparently at [address] about 2 years ago. Now with residual RUE and RLE weakness. Walks with cane and occasionally uses wheelchair. Cardiovascular  Within defined limits                Exercise tolerance[de-identified] Limited by immobility and weakness  Comments: Denies recent CP, SOB or Palpitations    Echo 2018 showing normal LVEF and no significant valvular disease. GI/Hepatic/Renal  Within defined limits              Endo/Other        Obesity     Other Findings              Physical Exam    Airway  Mallampati: II  TM Distance: 4 - 6 cm  Neck ROM: normal range of motion   Mouth opening: Normal     Cardiovascular  Regular rate and rhythm,  S1 and S2 normal,  no murmur, click, rub, or gallop             Dental  No notable dental hx       Pulmonary  Breath sounds clear to auscultation               Abdominal  GI exam deferred       Other Findings            Anesthetic Plan    ASA: 3  Anesthesia type: general          Induction: Intravenous  Anesthetic plan and risks discussed with: Patient      Pt evaluated by neurosurgery preoperatively. C-spine flexion and extension Xrays done preoperatively and no instability noted.  Pt is part of [facility] program.

## 2020-02-05 NOTE — PERIOP NOTES
Patient drank Pre-Surgical drink as instructed:   Pre Surgical drink consumed over 5-10 minutes PTA DOS    Patient placed in warming gown with temperature set to warm, Fluids for IV/hydration warmed as instructed and instructions for using dial.  Yellow \"fall risk socks applied to pt\" d/t past history.

## 2020-02-05 NOTE — BRIEF OP NOTE
BRIEF OPERATIVE NOTE    Date of Procedure: 2/5/2020   Preoperative Diagnosis: BRCA1 positive [Z15.01, Z15.09]  Postoperative Diagnosis: BRCA1 positive [Z15.01, Z15.09]    Procedure(s):  ROBOTIC ASSISTED HYSTERECTOMY TOTAL LAPAROSCOPIC WITH BILATERAL SALPINGO-OOPHORECTOMY/ERAS; CYSTOSCOPY  Surgeon(s) and Role:     * Davis Ackerman MD - Primary         Surgical Assistant: n/a    Surgical Staff:  Circ-1: Da Shetty RN  Scrub Tech-1: Wellington Malin  Scrub Tech-2: Jose Luis WILLAMS  Event Time In Time Out   Incision Start 1407    Incision Close 1619      Anesthesia: General   Estimated Blood Loss: 150 mL  Specimens:   ID Type Source Tests Collected by Time Destination   1 : uterus; bilateral tubes and ovaries Fresh   Davis Ackerman MD 2/5/2020 1450 Pathology      Findings: Moderate Endometriosis, left ovary adhesed to sidewall.   Otherwise NL appearing tubes and ovaries   Complications: none  Implants: * No implants in log *

## 2020-02-05 NOTE — INTERVAL H&P NOTE
H&P Update: Armen Killian was seen and examined. History and physical has been reviewed. The patient has been examined.  There have been no significant clinical changes since the completion of the originally dated History and Physical.

## 2020-02-05 NOTE — PROGRESS NOTES
Admission assessment completed. Pt alert when stimulated.  at bedside. Verbalizes needs. Bed alarm on for safety. SCDs in place. Pt has right side weakness upper and lower. 20 Rt hand. 4 P sites. C/d/i with dermabond. Wright to bedside bag to stay approxmately 4-5 hours after pacu due to patient's high fall risk. Taking sips of ginger ale. Safety measures in place.

## 2020-02-05 NOTE — PROGRESS NOTES
02/05/20 1802   Dual Skin Pressure Injury Assessment   Dual Skin Pressure Injury Assessment WDL   Second Care Provider (Based on Facility Policy) [facility] [place], Rn   Skin Integumentary   Skin Integumentary (WDL) X   Skin Color Appropriate for ethnicity   Skin Condition/Temp Warm   Skin Integrity Incision (comment)  (multiple puncture sites c/d/i)

## 2020-02-06 ENCOUNTER — APPOINTMENT (OUTPATIENT)
Dept: CT IMAGING | Age: 30
End: 2020-02-06
Attending: INTERNAL MEDICINE
Payer: COMMERCIAL

## 2020-02-06 ENCOUNTER — APPOINTMENT (OUTPATIENT)
Dept: MRI IMAGING | Age: 30
End: 2020-02-06
Attending: INTERNAL MEDICINE
Payer: COMMERCIAL

## 2020-02-06 PROBLEM — F48.9 PSYCHOGENIC DISORDER: Status: ACTIVE | Noted: 2020-02-06

## 2020-02-06 LAB
ALBUMIN SERPL-MCNC: 3.1 G/DL (ref 3.5–5)
ALBUMIN/GLOB SERPL: 0.9 {RATIO} (ref 1.2–3.5)
ALP SERPL-CCNC: 68 U/L (ref 50–130)
ALT SERPL-CCNC: 19 U/L (ref 12–65)
ANION GAP SERPL CALC-SCNC: ABNORMAL MMOL/L (ref 7–16)
AST SERPL-CCNC: 13 U/L (ref 15–37)
BASOPHILS # BLD: 0 K/UL (ref 0–0.2)
BASOPHILS NFR BLD: 0 % (ref 0–2)
BILIRUB SERPL-MCNC: 0.4 MG/DL (ref 0.2–1.1)
BUN SERPL-MCNC: 9 MG/DL (ref 6–23)
CALCIUM SERPL-MCNC: 8.9 MG/DL (ref 8.3–10.4)
CHLORIDE SERPL-SCNC: 112 MMOL/L (ref 98–107)
CO2 SERPL-SCNC: 26 MMOL/L (ref 21–32)
CREAT SERPL-MCNC: 0.78 MG/DL (ref 0.6–1)
DIFFERENTIAL METHOD BLD: ABNORMAL
EOSINOPHIL # BLD: 0 K/UL (ref 0–0.8)
EOSINOPHIL NFR BLD: 0 % (ref 0.5–7.8)
ERYTHROCYTE [DISTWIDTH] IN BLOOD BY AUTOMATED COUNT: 14 % (ref 11.9–14.6)
GLOBULIN SER CALC-MCNC: 3.3 G/DL (ref 2.3–3.5)
GLUCOSE BLD STRIP.AUTO-MCNC: 106 MG/DL (ref 65–100)
GLUCOSE SERPL-MCNC: 85 MG/DL (ref 65–100)
HCT VFR BLD AUTO: 37.7 % (ref 35.8–46.3)
HGB BLD-MCNC: 11.7 G/DL (ref 11.7–15.4)
IMM GRANULOCYTES # BLD AUTO: 0 K/UL (ref 0–0.5)
IMM GRANULOCYTES NFR BLD AUTO: 0 % (ref 0–5)
INR PPP: 1
LYMPHOCYTES # BLD: 2.4 K/UL (ref 0.5–4.6)
LYMPHOCYTES NFR BLD: 26 % (ref 13–44)
MCH RBC QN AUTO: 30.5 PG (ref 26.1–32.9)
MCHC RBC AUTO-ENTMCNC: 31 G/DL (ref 31.4–35)
MCV RBC AUTO: 98.2 FL (ref 79.6–97.8)
MONOCYTES # BLD: 0.7 K/UL (ref 0.1–1.3)
MONOCYTES NFR BLD: 7 % (ref 4–12)
NEUTS SEG # BLD: 6.1 K/UL (ref 1.7–8.2)
NEUTS SEG NFR BLD: 66 % (ref 43–78)
NRBC # BLD: 0 K/UL (ref 0–0.2)
PLATELET # BLD AUTO: 323 K/UL (ref 150–450)
PMV BLD AUTO: 9.9 FL (ref 9.4–12.3)
POTASSIUM SERPL-SCNC: 3.7 MMOL/L (ref 3.5–5.1)
PROT SERPL-MCNC: 6.4 G/DL (ref 6.3–8.2)
PROTHROMBIN TIME: 12.9 SEC (ref 12–14.7)
RBC # BLD AUTO: 3.84 M/UL (ref 4.05–5.2)
SODIUM SERPL-SCNC: 137 MMOL/L (ref 136–145)
WBC # BLD AUTO: 9.3 K/UL (ref 4.3–11.1)

## 2020-02-06 PROCEDURE — 72156 MRI NECK SPINE W/O & W/DYE: CPT

## 2020-02-06 PROCEDURE — 85610 PROTHROMBIN TIME: CPT

## 2020-02-06 PROCEDURE — 74011250636 HC RX REV CODE- 250/636: Performed by: OBSTETRICS & GYNECOLOGY

## 2020-02-06 PROCEDURE — 74011000258 HC RX REV CODE- 258: Performed by: OBSTETRICS & GYNECOLOGY

## 2020-02-06 PROCEDURE — 36415 COLL VENOUS BLD VENIPUNCTURE: CPT

## 2020-02-06 PROCEDURE — 74011250636 HC RX REV CODE- 250/636: Performed by: INTERNAL MEDICINE

## 2020-02-06 PROCEDURE — 74011636320 HC RX REV CODE- 636/320: Performed by: OBSTETRICS & GYNECOLOGY

## 2020-02-06 PROCEDURE — 74011250637 HC RX REV CODE- 250/637: Performed by: OBSTETRICS & GYNECOLOGY

## 2020-02-06 PROCEDURE — A9575 INJ GADOTERATE MEGLUMI 0.1ML: HCPCS | Performed by: OBSTETRICS & GYNECOLOGY

## 2020-02-06 PROCEDURE — 80053 COMPREHEN METABOLIC PANEL: CPT

## 2020-02-06 PROCEDURE — 71260 CT THORAX DX C+: CPT

## 2020-02-06 PROCEDURE — 85025 COMPLETE CBC W/AUTO DIFF WBC: CPT

## 2020-02-06 PROCEDURE — 70496 CT ANGIOGRAPHY HEAD: CPT

## 2020-02-06 PROCEDURE — 71250 CT THORAX DX C-: CPT

## 2020-02-06 PROCEDURE — 70450 CT HEAD/BRAIN W/O DYE: CPT

## 2020-02-06 PROCEDURE — 70553 MRI BRAIN STEM W/O & W/DYE: CPT

## 2020-02-06 PROCEDURE — 0042T CT PERF W CBF: CPT

## 2020-02-06 PROCEDURE — 82962 GLUCOSE BLOOD TEST: CPT

## 2020-02-06 RX ORDER — GADOTERATE MEGLUMINE 376.9 MG/ML
17 INJECTION INTRAVENOUS
Status: COMPLETED | OUTPATIENT
Start: 2020-02-06 | End: 2020-02-06

## 2020-02-06 RX ORDER — KETOROLAC TROMETHAMINE 10 MG/1
10 TABLET, FILM COATED ORAL
Qty: 20 TAB | Refills: 0 | Status: SHIPPED | OUTPATIENT
Start: 2020-02-06 | End: 2020-02-11

## 2020-02-06 RX ORDER — SODIUM CHLORIDE 0.9 % (FLUSH) 0.9 %
10 SYRINGE (ML) INJECTION
Status: COMPLETED | OUTPATIENT
Start: 2020-02-06 | End: 2020-02-06

## 2020-02-06 RX ORDER — OXYCODONE HYDROCHLORIDE 5 MG/1
5 TABLET ORAL
Status: DISCONTINUED | OUTPATIENT
Start: 2020-02-06 | End: 2020-02-07 | Stop reason: HOSPADM

## 2020-02-06 RX ORDER — CALCIUM CARBONATE 200(500)MG
200 TABLET,CHEWABLE ORAL
Status: DISCONTINUED | OUTPATIENT
Start: 2020-02-07 | End: 2020-02-07 | Stop reason: HOSPADM

## 2020-02-06 RX ORDER — LORAZEPAM 2 MG/ML
1 INJECTION INTRAMUSCULAR ONCE
Status: COMPLETED | OUTPATIENT
Start: 2020-02-06 | End: 2020-02-06

## 2020-02-06 RX ORDER — OXYCODONE AND ACETAMINOPHEN 5; 325 MG/1; MG/1
1 TABLET ORAL
Qty: 12 TAB | Refills: 0 | Status: SHIPPED | OUTPATIENT
Start: 2020-02-06 | End: 2020-02-11

## 2020-02-06 RX ADMIN — PANTOPRAZOLE SODIUM 40 MG: 40 TABLET, DELAYED RELEASE ORAL at 10:08

## 2020-02-06 RX ADMIN — ACETAMINOPHEN 1000 MG: 500 TABLET, FILM COATED ORAL at 03:22

## 2020-02-06 RX ADMIN — KETOROLAC TROMETHAMINE 30 MG: 30 INJECTION, SOLUTION INTRAMUSCULAR at 03:23

## 2020-02-06 RX ADMIN — OXYCODONE 5 MG: 5 TABLET ORAL at 10:09

## 2020-02-06 RX ADMIN — IOPAMIDOL 80 ML: 755 INJECTION, SOLUTION INTRAVENOUS at 16:54

## 2020-02-06 RX ADMIN — GADOTERATE MEGLUMINE 17 ML: 376.9 INJECTION INTRAVENOUS at 19:27

## 2020-02-06 RX ADMIN — Medication 10 ML: at 12:03

## 2020-02-06 RX ADMIN — ACETAMINOPHEN 1000 MG: 500 TABLET, FILM COATED ORAL at 16:12

## 2020-02-06 RX ADMIN — OXYCODONE 5 MG: 5 TABLET ORAL at 20:14

## 2020-02-06 RX ADMIN — CLONAZEPAM 1 MG: 1 TABLET ORAL at 09:01

## 2020-02-06 RX ADMIN — OXYCODONE HYDROCHLORIDE 20 MG: 20 TABLET, FILM COATED, EXTENDED RELEASE ORAL at 09:01

## 2020-02-06 RX ADMIN — KETOROLAC TROMETHAMINE 30 MG: 30 INJECTION, SOLUTION INTRAMUSCULAR at 16:11

## 2020-02-06 RX ADMIN — PROMETHAZINE HYDROCHLORIDE 12.5 MG: 25 INJECTION INTRAMUSCULAR; INTRAVENOUS at 14:02

## 2020-02-06 RX ADMIN — ACETAMINOPHEN 1000 MG: 500 TABLET, FILM COATED ORAL at 10:09

## 2020-02-06 RX ADMIN — FAMOTIDINE 20 MG: 20 TABLET, FILM COATED ORAL at 22:14

## 2020-02-06 RX ADMIN — Medication 10 ML: at 19:27

## 2020-02-06 RX ADMIN — DOCUSATE SODIUM 100 MG: 100 CAPSULE, LIQUID FILLED ORAL at 09:00

## 2020-02-06 RX ADMIN — NALOXONE HYDROCHLORIDE 0.4 MG: 0.4 INJECTION, SOLUTION INTRAMUSCULAR; INTRAVENOUS; SUBCUTANEOUS at 11:44

## 2020-02-06 RX ADMIN — LORAZEPAM 1 MG: 2 INJECTION INTRAMUSCULAR; INTRAVENOUS at 18:32

## 2020-02-06 RX ADMIN — POLYETHYLENE GLYCOL (3350) 17 G: 17 POWDER, FOR SOLUTION ORAL at 09:00

## 2020-02-06 RX ADMIN — DULOXETINE HYDROCHLORIDE 60 MG: 60 CAPSULE, DELAYED RELEASE ORAL at 09:00

## 2020-02-06 RX ADMIN — Medication 400 MG: at 22:14

## 2020-02-06 RX ADMIN — LORATADINE 10 MG: 10 TABLET ORAL at 09:00

## 2020-02-06 RX ADMIN — Medication 10 ML: at 16:54

## 2020-02-06 RX ADMIN — BACLOFEN 5 MG: 10 TABLET ORAL at 09:01

## 2020-02-06 RX ADMIN — KETOROLAC TROMETHAMINE 30 MG: 30 INJECTION, SOLUTION INTRAMUSCULAR at 09:01

## 2020-02-06 RX ADMIN — PREGABALIN 150 MG: 150 CAPSULE ORAL at 09:00

## 2020-02-06 RX ADMIN — OXYCODONE 10 MG: 5 TABLET ORAL at 01:21

## 2020-02-06 RX ADMIN — ONDANSETRON 4 MG: 4 TABLET, ORALLY DISINTEGRATING ORAL at 12:39

## 2020-02-06 RX ADMIN — SODIUM CHLORIDE 100 ML: 900 INJECTION, SOLUTION INTRAVENOUS at 12:03

## 2020-02-06 RX ADMIN — SODIUM CHLORIDE, SODIUM LACTATE, POTASSIUM CHLORIDE, AND CALCIUM CHLORIDE 40 ML/HR: 600; 310; 30; 20 INJECTION, SOLUTION INTRAVENOUS at 10:12

## 2020-02-06 RX ADMIN — IOPAMIDOL 100 ML: 755 INJECTION, SOLUTION INTRAVENOUS at 12:03

## 2020-02-06 RX ADMIN — SODIUM CHLORIDE 100 ML: 900 INJECTION, SOLUTION INTRAVENOUS at 16:54

## 2020-02-06 RX ADMIN — BACLOFEN 10 MG: 10 TABLET ORAL at 22:14

## 2020-02-06 RX ADMIN — CELECOXIB 200 MG: 200 CAPSULE ORAL at 09:01

## 2020-02-06 NOTE — PROGRESS NOTES
Pt assisted to chair. Right sided weakness per baseline;  states she slides/drags/picks up right extremities and uses a cane at home. After getting into chair, pt closed eyes and did not respond to her name. Did not respond to pain. After about 60-90 seconds pt opened eyes but did not speak to us. See VSS. BG was 106. O2 at 3L placed via NC. Code S was called. Dr Rohini Lloyd and Dr Viky Alcantara were on the floor. Pt was assisted back to bed, and escorted to CT. Narcan . 4 mg given at 1144 en route; pt received all of her scheduled meds, and a 5 mg oxycodone as noted.

## 2020-02-06 NOTE — PROGRESS NOTES
Patient resting in bed, spouse at bedside. Drowsy, responds to voice. Respirations present, non-labored. 2 LPM oxygen via NC. 4 Lap sites to abdomen -CDI. PIV infusing w/o difficulty. Wright draining at bedside. SCD's in place bilaterally. Safety measures in place. Denies needs at this time. Will continue to monitor.

## 2020-02-06 NOTE — PROGRESS NOTES
Stable at present, although just received some phenergan for nausea and is resting. Was more alert since the narcan per her . CT and labs all good,   Lab Results   Component Value Date/Time    Hemoglobin (POC) 12.8 08/21/2019 09:45 AM    HGB 11.7 02/06/2020 01:25 PM      A/p - s/p hyst, I suspect she had been a little overmedicated. I recommended avoiding her long-acting opiates for now.

## 2020-02-06 NOTE — PROGRESS NOTES
Phenergan 12.5 given per order for continued nausea. Pt is alert and answering questions; asking to get up to use bathroom. Bedpan used for now.  at bedside. Enc po fluids.

## 2020-02-06 NOTE — PROGRESS NOTES
POSTOPERATIVE DAY 1 Procedure(s):  ROBOTIC ASSISTED HYSTERECTOMY TOTAL LAPAROSCOPIC WITH BILATERAL SALPINGO-OOPHORECTOMY/ERAS; CYSTOSCOPY    [address]  [id number]      S:Patient without complaints. Tolerating regular diet. Nursing did not remove silverman until this AM.  Has not been OOB but RN in room now to assist to BR. Minimal vaginal bleeding. O: Blood pressure 123/79, pulse 83, temperature 97.6 °F (36.4 °C), resp. rate 19, height 5' 4\" (1.626 m), weight 189 lb (85.7 kg), last menstrual period 01/08/2020, SpO2 99 %, not currently breastfeeding. Alert and oriented times three  No acute distress  Abdomen soft, appropriately tender. Incisions clean, dry, and intact. Extremities without evidence of DVT  Lab Results   Component Value Date/Time    HGB 15.7 (H) 01/30/2020 04:05 PM      Assessment:  Stable postoperative course. Will discharge home. Instructions reviewed. Prescriptions given for Percocet, Toradol, patient instructed to hold other NSAIDs,  present and voiced understanding. Follow up in office in 1-2 weeks.     Lora Hannah MD  February 6, 2020  11:27 AM

## 2020-02-06 NOTE — CONSULTS
Hospitalist Progress Note    2020  Admit Date: 2020 11:04 AM   NAME: Megha Loza   :  1990   MRN:  [id number]   Attending: Trevor Valenzuela MD  PCP:  Humberto Anthony MD    SUBJECTIVE:   31 yo CF with past history of Chiari malformation s/p multiple resections and revisions complicated by right arm and right leg weakness, psychogenic seizures, headache disorder admitted to the Ob/Gyn service and now POD #1 for laparoscopic bilateral hysterectomy and oophorectomy/cystoscopy with findings of moderate endometriosis. Hospitalist consulted following Rapid Response in which patient was observed to be unresponsive after being transferred from bed to chair. Blood sugar of 106, patient given Narcan 0.4 mg (patient had been given both long and short acting narcotic earlier in the morning) with some improvement. Patient would track with her eyes and would wiggle her left toes and hand. Code S called due to new neurologic changes with documented NIH of 6, STAT CT head without contrast and CT perfusion/CTA are unremarkable for new acute etiology, these imaging did not a RUL lesion in which infection could not be ruled out. STAT neurology consult, recommendations to limit pain medications as much as possible and to get repeat MRI head/neck given past Chiari surgeries. After coming back from CT imaging, patient more interactive and complaining of nausea. Denies fevers/chills, chest pain, shortness of breath.      Review of Systems negative with exception of pertinent positives noted above  PHYSICAL EXAM     Visit Vitals  /76 (BP 1 Location: Right arm, BP Patient Position: At rest)   Pulse (!) 107   Temp 99.8 °F (37.7 °C)   Resp 14   Ht 5' 4\" (1.626 m)   Wt 85.7 kg (189 lb)   LMP 2020 (Approximate)   SpO2 100%   Breastfeeding No   BMI 32.44 kg/m²      Temp (24hrs), Av.5 °F (36.9 °C), Min:97.2 °F (36.2 °C), Max:100.8 °F (38.2 °C)    Oxygen Therapy  O2 Sat (%): 100 % (20 1306)  Pulse via Oximetry: 108 beats per minute (02/05/20 1129)  O2 Device: Nasal cannula (02/06/20 1306)  O2 Flow Rate (L/min): 2 l/min (02/06/20 1306)    Intake/Output Summary (Last 24 hours) at 2/6/2020 1547  Last data filed at 2/6/2020 0907  Gross per 24 hour   Intake    Output 2025 ml   Net -2025 ml      General: No acute distress, tired appearing   Lungs:  CTA Bilaterally. Heart:  Regular rate and rhythm,  No murmur, rub, or gallop  Abdomen: Soft, Non distended, Non tender, Positive bowel sounds  Extremities: No cyanosis, clubbing or edema  Neurologic:  No focal deficits. PERRL b/l. Noted weakness on right hand and right leg that appears to be chronic. CN II thru XII intact b/l. ASSESSMENT      Active Hospital Problems    Diagnosis Date Noted    Psychogenic disorder 02/06/2020    BRCA1 positive 02/05/2020    BRCA1 gene mutation positive 10/15/2019     dup exons 18-20: [id number]      Severe obesity ([facility] [address] 08/21/2019    Chiari I malformation ([facility] [address] 05/18/2016     Plan:    # Stroke-like symptoms in the setting of known psychogenic seizures and prior Chiari surgeries  - CT head imaging unremarkable for acute etiology  - will order MRI head/cervical spine per neurology recommendations   - limit pain medications as tolerated  - limit narcotics and sedative/hypnotics  - unlikely to be a seizure as patient has had very extensive seizure workup in the past    # ? RUL lesion on CT imaging  - unclear what this is, unlikely to be infectious  - repeat CBC, CMP  - will ordered dedicated CT chest with contrast to further workup    # Endometrosis/BRCA positive s/p JESSICA and oophorectomy/cystoscopy  - post-operative management per primary    F/E/N: no fluids, replete electrolytes as needed, regular diet    Ppx: SCDs for VTE    Code Status: FULL CODE    Thank you for this consult. Hospitalist will follow along. Please page/text with questions or concerns.      Signed By: Freya Garcia DO     February 6, 2020

## 2020-02-06 NOTE — PROGRESS NOTES
Code \"S\" called on patient. SW following. Patient transported to MRI, no family at the bedside.      [place], [address] [facility]    [address] [email or web address]

## 2020-02-06 NOTE — PROGRESS NOTES
02/05/20 2112   Incentive Spirometry Treatment   Level of Service Initial   Predicted Volume (ml) 1500 ml   Actual Volume (ml) 1000 ml   % Predicted Volume (ml) 0.67   Treatment Tolerance Needs encouragement; Instructed; Patient tolerated   Pulmonary Toilet   Pulmonary Toilet Incentive Spirometry

## 2020-02-06 NOTE — PROGRESS NOTES
Rapid response called @1132; Pt in room with primary nurse [place] 4229 educator RN Gracie WILLAMS. Pt unresponsive for almost 90 seconds.  at bedside. Dr. Sam Hernández, RT, [facility] in room. QL=808/67, 102, 97.2, 12; . Pt pale with decent reflexes. Code stroke called @4382 by MD Dr. Sam Hernández. Narcan 0.4 mg IV given at 978 6222 as pt was taken down to CT. House supervisor Lorenzo Rojas here with pt also.

## 2020-02-06 NOTE — PROGRESS NOTES
ERAS/Stroke Navigator visiting patient to assist with ambulation to chair with primary RN, walker also utilized. Pt tolerated fairly well, drowsy, but able to take a few steps to the chair. Pt reporting pain, we assisted with repositioning and encouraged deep breathing exercises. This RN went to get linens for patient and upon returning to room, pt found to be unresponsive to painful stimuli - rapid response called. Team arrived and approximately 90 seconds after pt found unresponsive, she began opening her eyes and was responsive to voice, tongue drooping side and left sided weakness noted, MD advised to call a Code S and order for Narcan recieved. Pt given Narcan and NIH completed during transfer - house supervisor called MD to advise NIH >6 and to see if he wanted CTA/CTP, orders were placed by him. Pt brought down to CT scan, CT w/o contrast completed, but IV access was difficult to obtain for CTA/CTP.   After multiple attempts, anesthesia came to place an IV near [facility] for contrast.  CTA/CTP completed and pt returned to room where neurologist was waiting for pt via telemed 55F with HTN, HLD presents for ventricular tachycardia during stress test.    Ventricular tachycardia, polymorphic. Given anginal symptoms and early family history of CAD, likely 2/2 obstructive CAD. HsTrop not elevated likely reflecting minimal cardiac damage.  - [facility] today  - will cont to follow  - discussed with interventional and EP attendings

## 2020-02-06 NOTE — PROGRESS NOTES
Pt given 5 mg oxycodone for continued c/o pain/moaning with movement. Instructed pt that she will be getting oob with assist.   at bedside.

## 2020-02-06 NOTE — PROGRESS NOTES
Problem: Vaginal Hysterectomy: Post-Op Day 1/Day of Discharge  Goal: Activity/Safety  Outcome: Slow progression due to pre existing condition. Drowsy but in pain. Will encourage activity; IS. Family at bedside.    Goal: Nutrition/Diet  Outcome: Progressing Towards Goal. Natalia fluids, not much food yet  Goal: Discharge Planning  Outcome: Progressing Towards Goal.

## 2020-02-06 NOTE — PROGRESS NOTES
Patient has become difficult to arouse. She received her long-acting and also a short acting oxy recently. RR called, hospitalist here and managing. Will defer to them. May benefit from narcan.

## 2020-02-07 VITALS
DIASTOLIC BLOOD PRESSURE: 67 MMHG | WEIGHT: 189 LBS | HEART RATE: 88 BPM | OXYGEN SATURATION: 98 % | SYSTOLIC BLOOD PRESSURE: 103 MMHG | TEMPERATURE: 98.8 F | HEIGHT: 64 IN | RESPIRATION RATE: 16 BRPM | BODY MASS INDEX: 32.27 KG/M2

## 2020-02-07 PROBLEM — G93.41 ACUTE METABOLIC ENCEPHALOPATHY: Status: ACTIVE | Noted: 2020-02-07

## 2020-02-07 LAB
ANION GAP SERPL CALC-SCNC: 5 MMOL/L (ref 7–16)
BASOPHILS # BLD: 0 K/UL (ref 0–0.2)
BASOPHILS NFR BLD: 0 % (ref 0–2)
BUN SERPL-MCNC: 12 MG/DL (ref 6–23)
CALCIUM SERPL-MCNC: 8.3 MG/DL (ref 8.3–10.4)
CHLORIDE SERPL-SCNC: 113 MMOL/L (ref 98–107)
CO2 SERPL-SCNC: 25 MMOL/L (ref 21–32)
CREAT SERPL-MCNC: 0.7 MG/DL (ref 0.6–1)
DIFFERENTIAL METHOD BLD: ABNORMAL
EOSINOPHIL # BLD: 0.1 K/UL (ref 0–0.8)
EOSINOPHIL NFR BLD: 1 % (ref 0.5–7.8)
ERYTHROCYTE [DISTWIDTH] IN BLOOD BY AUTOMATED COUNT: 14 % (ref 11.9–14.6)
GLUCOSE SERPL-MCNC: 91 MG/DL (ref 65–100)
HCT VFR BLD AUTO: 31.4 % (ref 35.8–46.3)
HGB BLD-MCNC: 9.6 G/DL (ref 11.7–15.4)
IMM GRANULOCYTES # BLD AUTO: 0 K/UL (ref 0–0.5)
IMM GRANULOCYTES NFR BLD AUTO: 1 % (ref 0–5)
LYMPHOCYTES # BLD: 2.2 K/UL (ref 0.5–4.6)
LYMPHOCYTES NFR BLD: 33 % (ref 13–44)
MCH RBC QN AUTO: 30.2 PG (ref 26.1–32.9)
MCHC RBC AUTO-ENTMCNC: 30.6 G/DL (ref 31.4–35)
MCV RBC AUTO: 98.7 FL (ref 79.6–97.8)
MONOCYTES # BLD: 0.5 K/UL (ref 0.1–1.3)
MONOCYTES NFR BLD: 7 % (ref 4–12)
NEUTS SEG # BLD: 3.9 K/UL (ref 1.7–8.2)
NEUTS SEG NFR BLD: 58 % (ref 43–78)
NRBC # BLD: 0 K/UL (ref 0–0.2)
PLATELET # BLD AUTO: 263 K/UL (ref 150–450)
PMV BLD AUTO: 9.8 FL (ref 9.4–12.3)
POTASSIUM SERPL-SCNC: 3.8 MMOL/L (ref 3.5–5.1)
RBC # BLD AUTO: 3.18 M/UL (ref 4.05–5.2)
SODIUM SERPL-SCNC: 143 MMOL/L (ref 136–145)
WBC # BLD AUTO: 6.6 K/UL (ref 4.3–11.1)

## 2020-02-07 PROCEDURE — 36415 COLL VENOUS BLD VENIPUNCTURE: CPT

## 2020-02-07 PROCEDURE — 85025 COMPLETE CBC W/AUTO DIFF WBC: CPT

## 2020-02-07 PROCEDURE — 74011250637 HC RX REV CODE- 250/637: Performed by: HOSPITALIST

## 2020-02-07 PROCEDURE — 74011250637 HC RX REV CODE- 250/637: Performed by: OBSTETRICS & GYNECOLOGY

## 2020-02-07 PROCEDURE — 80048 BASIC METABOLIC PNL TOTAL CA: CPT

## 2020-02-07 RX ADMIN — PANTOPRAZOLE SODIUM 40 MG: 40 TABLET, DELAYED RELEASE ORAL at 08:08

## 2020-02-07 RX ADMIN — CLONAZEPAM 1 MG: 1 TABLET ORAL at 09:43

## 2020-02-07 RX ADMIN — ANTACID TABLETS 200 MG: 500 TABLET, CHEWABLE ORAL at 15:07

## 2020-02-07 RX ADMIN — LORATADINE 10 MG: 10 TABLET ORAL at 09:44

## 2020-02-07 RX ADMIN — ACETAMINOPHEN 1000 MG: 500 TABLET, FILM COATED ORAL at 09:44

## 2020-02-07 RX ADMIN — OXYCODONE 5 MG: 5 TABLET ORAL at 00:15

## 2020-02-07 RX ADMIN — PREGABALIN 150 MG: 150 CAPSULE ORAL at 09:44

## 2020-02-07 RX ADMIN — ACETAMINOPHEN 1000 MG: 500 TABLET, FILM COATED ORAL at 03:48

## 2020-02-07 RX ADMIN — POLYETHYLENE GLYCOL (3350) 17 G: 17 POWDER, FOR SOLUTION ORAL at 09:43

## 2020-02-07 RX ADMIN — BACLOFEN 5 MG: 10 TABLET ORAL at 09:44

## 2020-02-07 RX ADMIN — ANTACID TABLETS 200 MG: 500 TABLET, CHEWABLE ORAL at 08:08

## 2020-02-07 RX ADMIN — OXYCODONE 5 MG: 5 TABLET ORAL at 04:07

## 2020-02-07 RX ADMIN — CELECOXIB 200 MG: 200 CAPSULE ORAL at 09:44

## 2020-02-07 RX ADMIN — DULOXETINE HYDROCHLORIDE 60 MG: 60 CAPSULE, DELAYED RELEASE ORAL at 09:44

## 2020-02-07 RX ADMIN — ACETAMINOPHEN 1000 MG: 500 TABLET, FILM COATED ORAL at 15:08

## 2020-02-07 NOTE — PROGRESS NOTES
Hospitalist Progress Note    2020  Admit Date: 2020 11:04 AM   NAME: López Neal   :  1990   MRN:  [id number]   Attending: Katie Parra MD  PCP:  Saleem Bruno MD    SUBJECTIVE:     29 yo CF with past history of Chiari malformation s/p multiple resections and revisions complicated by right arm and right leg weakness, psychogenic seizures, headache disorder admitted to the Ob/Gyn service and now POD #1 for laparoscopic bilateral hysterectomy and oophorectomy/cystoscopy with findings of moderate endometriosis. Hospitalist consulted following Rapid Response in which patient was observed to be unresponsive after being transferred from bed to chair. Blood sugar of 106, patient given Narcan 0.4 mg (patient had been given both long and short acting narcotic earlier in the morning) with some improvement. Patient would track with her eyes and would wiggle her left toes and hand. Code S called due to new neurologic changes with documented NIH of 6, STAT CT head without contrast and CT perfusion/CTA are unremarkable for new acute etiology, these imaging did not a RUL lesion in which infection could not be ruled out. STAT neurology consult, recommendations to limit pain medications as much as possible and to get repeat MRI head/neck given past Chiari surgeries. After coming back from CT imaging, patient more interactive and complaining of nausea. Denies fevers/chills, chest pain, shortness of breath. Interval History (): patient examined at bedside. No acute overnight events. Sleepy at bedside but arousable to voice. Ate breakfast this morning without issue. Abdominal pain is \"so so\".  and mother at bedside. Has been able to get up to go to the bathroom with some assistance. No further events like that which transpired yesterday. She denies fevers/chills, chest pain, shortness of breath, nausea/vomiting or diarrhea.      Review of Systems negative with exception of pertinent positives noted above  PHYSICAL EXAM     Visit Vitals  BP 98/53 (BP 1 Location: Left arm, BP Patient Position: At rest;Supine)   Pulse 88   Temp 98.5 °F (36.9 °C)   Resp 17   Ht 5' 4\" (1.626 m)   Wt 85.7 kg (189 lb)   LMP 2020 (Approximate)   SpO2 93%   Breastfeeding No   BMI 32.44 kg/m²      Temp (24hrs), Av.6 °F (37 °C), Min:97.2 °F (36.2 °C), Max:99.8 °F (37.7 °C)    Oxygen Therapy  O2 Sat (%): 93 % (20)  Pulse via Oximetry: 108 beats per minute (20 112)  O2 Device: Nasal cannula (20)  O2 Flow Rate (L/min): 2 l/min (20)    Intake/Output Summary (Last 24 hours) at 2020 1123  Last data filed at 2020 1105  Gross per 24 hour   Intake 1040 ml   Output 2850 ml   Net -1810 ml      General:          No acute distress, tired appearing   Lungs:             CTA Bilaterally without R/R/W  Heart:              Regular rate and rhythm,  No murmur, rub, or gallop  Abdomen:        Soft, Non distended, Non tender, Positive bowel sounds  Extremities:     No cyanosis, clubbing or edema  Neurologic:      No focal deficits. PERRL b/l. Noted weakness on right hand and right leg that appears to be chronic. CN II thru XII intact b/l.      ASSESSMENT      Active Hospital Problems    Diagnosis Date Noted    Acute metabolic encephalopathy 3671    Psychogenic disorder 2020    BRCA1 positive 2020    BRCA1 gene mutation positive 10/15/2019     dup exons 18-20: [id number]      Severe obesity ([facility] [address] 2019    Chiari I malformation ([facility] 2016     Plan:    # Acute metabolic encephalopathy (s/p Rapid Response and Code S) with known history of psychogenic seizures and prior Chiari surgeries  - likely related to pain medications in the setting of known polypharmacy, patient's current medical regimen has been verified with family  - CT head and MRI head/neck imaging obtained during this hospitalization has been unremarkable for any explanable acute etiology  - patient has had very extensive seizure workup with EEG video monitoring at [address] in the past which has been determined to most likely be psychogenic in etiology, unlikely to be seizures   - neurology consulted during this hospitalization and does not recommend any changes to patient's medical regimen  - as far as pain control post-op, will defer to primary team, but would recommend short acting medications instead of long-acting medications, she also follows with pain management and will need to continue to follow up as an outpatient   - limit narcotics and sedative/hypnotics as tolerated     # ? RUL lesion on CT imaging, likely post-operative  - patient with normal WBC count and afebrile     # Endometrosis/BRCA positive s/p JESSICA and oophorectomy/cystoscopy, POD #2  - post-operative management per primary  - encourage use of IS and deep breaths     F/E/N: no fluids, replete electrolytes as needed, regular diet     Ppx: SCDs for VTE     Code Status: FULL CODE     Thank you for this consult. Patient can be discharged from medical standpoint, will defer to primary. Hospitalist will sign off. Please page/text with questions or concerns.      Signed By: Wilhemenia Habermann, DO     February 7, 2020

## 2020-02-07 NOTE — PROGRESS NOTES
Patient assisted to [facility] and voided 900 ml of yellow urine. Ice pack placed on abdomen. Denies needs at this time.

## 2020-02-07 NOTE — PROGRESS NOTES
Pt sitting in chair, answering questions. Eating chick mary a brought in by friend of family. Has been voiding. Narcotics have been held today. Lap sites are clean and dry. Planning discharge this afternoon.

## 2020-02-07 NOTE — PROGRESS NOTES
Patient resting in bed, respirations present, non-labored. No signs of distress noted. Denies pain at this time.

## 2020-02-07 NOTE — PROGRESS NOTES
Patient resting in bed  Family at bedside. Drowsy, eyes open to voice. Respirations present, non-labored. 2 LPM oxygen via NC. telemetry in place. 4 PS to abdomen - CDI. PIV intact and patent. SCD's in place bilaterally. Safety measures in place. Will continue to monitor. Patient c/o of pain 5/10, see MAR.

## 2020-02-07 NOTE — PROGRESS NOTES
Spoke to the primary nurse via phone. Has been evaluated by hospitalist and has been cleared for discharge. Will discharge home, discuss with  re meds, take either Celebrex OR toradol, and do NOT take perocet or any other narcotics unless absolutely necessary.

## 2020-02-07 NOTE — PROGRESS NOTES
Initial visit by  to convey care and concern and encourage patient that  services are available if desired. Ms. Paresh Martino was resting and I spoke with her family. No spiritual needs were voiced during the visit. Provided 's business card for future reference. Chaplains remain available for support.      Juan Schafer 66  Board Certified

## 2020-02-07 NOTE — PROGRESS NOTES
POSTOPERATIVE DAY 2 Procedure(s):  ROBOTIC ASSISTED HYSTERECTOMY TOTAL LAPAROSCOPIC WITH BILATERAL SALPINGO-OOPHORECTOMY/ERAS; CYSTOSCOPY    Hilda Quigley  [id number]      S:Seems to have had a better night. However is sleepy at present but does respond to voice. Voided well overnight and ate a full evening meal.  No c/o except soreness in her abdomen, low back, also her right knee which is more chronic. Bleeding is appropriate. O: Blood pressure 98/53, pulse 88, temperature 98.5 °F (36.9 °C), resp. rate 17, height 5' 4\" (1.626 m), weight 189 lb (85.7 kg), last menstrual period 01/08/2020, SpO2 93 %, not currently breastfeeding. Alert and oriented times three  No acute distress  Abdomen soft, appropriately tender. Incisions clean, dry, and intact. Extremities without evidence of DVT. SCD hose in place. Lab Results   Component Value Date/Time    WBC 6.6 02/07/2020 05:45 AM    HGB 9.6 (L) 02/07/2020 05:45 AM    HCT 31.4 (L) 02/07/2020 05:45 AM    PLATELET 900 57/96/5065 05:45 AM      Assessment:  Stable, but still seems overmedicated. Spoke to hospitalist who will eval again later. Also spoke to  over the phone about my concerns, that perhaps she is getting more here at hospital than what she typically actually takes at home. Goal would be for her to take less meds and get her home. He agrees with plan. If possible,  Will discharge home. Instructions reviewed. Prescriptions given for Percocet, Toradol but will recommend she not take percocet unless ABSOLUTELY necessary. Follow up in office in 1-2 weeks.     Ashley Hirsch MD  February 7, 2020  11:54 AM

## 2020-02-07 NOTE — PROGRESS NOTES
I have reviewed discharge instructions with the patient and spouse The patient and spouse verbalized understanding. PIV x2 removed.

## 2020-02-09 NOTE — OP NOTES
Operative Report    Patient: Clinton Reaves MRN: [id number]  SSN: [id number]    YOB: 1990  Age: 30 y.o. Sex: female      Date of Surgery: 2/5/2020      Preoperative Diagnosis: BRCA1 positive [Z15.01, Z15.09]    Postoperative Diagnosis: BRCA1 positive [Z15.01, Z15.09]    Surgeon(s):  Cliff Ortiz MD    Anesthesia: General    Procedure: Robotic Assisted Total Laparoscopic Hysterectomy with Bilateral Salpingo-Oophorectomy less than 250 grams    Findings:  normal uterus, endometriosis, normal BSO and adhesion left ovary to sidewall    Estimated Blood Loss:  150 mL    Drains: none    Specimens:    ID Type Source Tests Collected by Time Destination   1 : uterus; bilateral tubes and ovaries Fresh   Jennifer Dias MD 2/5/2020 1454 Pathology       DVT Prophylaxis: SCD's or Sequential Compression Device and Lovenox    Antibiotic Prophylaxis: Ancef    Procedure Details:  After an adequate level of anesthesia was obtained, the patient was prepped and draped in the usual sterile fashion in the dorsal lithotomy position. Time out was done to confirm the operating procedure, surgeon, patient, pertinent data, and site. Once confirmed by the team, the procedure was started. A weighted speculum was placed in the vagina and the cervix was grasped with a tenaculum. The uterus was sounded to a depth of 8 cm and cervix dilated. The  manipulator was placed and balloon inflated. Instruments removed and gloves changed. An infraumbilical incision was made after first injecting with 0.5% marcaine, and the Veress needle inserted insufflating the peritoneal cavity with CO2. Opening pressure was noted to be 3mmHg. Once the pressure reached 15mmHg, the Veress was removed and trocar was placed. Scope was inserted; there was no bleeding and no evidence of visceral injury. Under visualization one 8mm lateral port was placed on each side. 8mm assistant port was placed in the left upper quadrant.  The robot was then attached to the trocars and the instruments were placed under visualization. I broke scrub and went to the console. Anatomic survey performed and findings noted as above. The ureters were identified and traced on both sides. There were filmy adhesions between epiploica and left sidewall which were taken down sharply. The ovary was freed up bluntly. The left retroepritoneal space was entered along the round ligament to identify the ureter. On each side, the infundibulopelvic ligament was clamped, coagulated, and divided using the Monopolar Scissors and Vessel sealer. The dissection was then taken down through the mesosalpinx and round ligament. The anterior and posterior peritoneum was taken down on each side, skeletonizing the uterine arteries. Bladder flap was developed and bladder was meticulously dissected down below the ring. The uterine arteries were clamped, coagulated, and cut across the ascending branches. Cardinal ligament was developed. The cardinal ligaments were taken down to the Koh-Ring. Colpotomy performed and circumscribed anteriorly and posteriorly. Reasonable hemostasis was assured. The uterus, tubes, and ovaries were removed. Asepto bulb placed in the vagina to maintain pneumoperitoneum. The vaginal cuff was closed with 0 V-Loc with running stitches. Bulb removed from vagina. The pneumoperitoneum was reduced to approximately 8 mmHg. The area was irrigated free of blood clots and debris. Hemostasis was noted at all points. The robot was detached. Wright was removed. I again scrubbed and returned to the patient. Cystoscopy was performed, 70 degree scope was inserted. Bladder distended to accommodate approximately 150-200cc. No evidence of bladder injury and no sutures seen in the bladder. Both ureteral orifices visualized and ureteral patency confirmed. Bladder drained and scope removed. Urinary catheter reinserted. Gloves changed. I then placed Renetta throughout the pelvis. Next we removed the trocars after pneumoperitoneum was fully reduced, and closed each of the port sites with 4-0 vicryl  and DermaBond on the skin. The patient tolerated the procedure well. She was cleaned up, returned to the flat supine position, and transported to the PACU in stable condition. Each count was correct.

## 2020-02-18 ENCOUNTER — HOSPITAL ENCOUNTER (OUTPATIENT)
Dept: CT IMAGING | Age: 30
Discharge: HOME | End: 2020-02-18
Attending: OBSTETRICS & GYNECOLOGY
Payer: COMMERCIAL

## 2020-02-18 ENCOUNTER — HOSPITAL ENCOUNTER (OUTPATIENT)
Age: 30
Setting detail: OBSERVATION
Discharge: HOME | End: 2020-02-20
Attending: EMERGENCY MEDICINE | Admitting: OBSTETRICS & GYNECOLOGY
Payer: COMMERCIAL

## 2020-02-18 DIAGNOSIS — R10.2 PELVIC PAIN IN FEMALE: ICD-10-CM

## 2020-02-18 DIAGNOSIS — G89.18 POSTOPERATIVE PAIN: Primary | ICD-10-CM

## 2020-02-18 DIAGNOSIS — N39.0 URINARY TRACT INFECTION WITHOUT HEMATURIA, SITE UNSPECIFIED: ICD-10-CM

## 2020-02-18 PROBLEM — R30.0 DYSURIA: Status: ACTIVE | Noted: 2020-02-18

## 2020-02-18 LAB
ALBUMIN SERPL-MCNC: 3.8 G/DL (ref 3.5–5)
ALBUMIN/GLOB SERPL: 1.1 {RATIO} (ref 1.2–3.5)
ALP SERPL-CCNC: 102 U/L (ref 50–136)
ALT SERPL-CCNC: 23 U/L (ref 12–65)
ANION GAP SERPL CALC-SCNC: 5 MMOL/L (ref 7–16)
APPEARANCE UR: ABNORMAL
AST SERPL-CCNC: 14 U/L (ref 15–37)
BACTERIA URNS QL MICRO: ABNORMAL /HPF
BILIRUB SERPL-MCNC: 0.3 MG/DL (ref 0.2–1.1)
BILIRUB UR QL: NEGATIVE
BUN SERPL-MCNC: 18 MG/DL (ref 6–23)
CALCIUM SERPL-MCNC: 9.2 MG/DL (ref 8.3–10.4)
CASTS URNS QL MICRO: ABNORMAL /LPF
CHLORIDE SERPL-SCNC: 112 MMOL/L (ref 98–107)
CO2 SERPL-SCNC: 25 MMOL/L (ref 21–32)
COLOR UR: YELLOW
CREAT SERPL-MCNC: 0.93 MG/DL (ref 0.6–1)
EPI CELLS #/AREA URNS HPF: ABNORMAL /HPF
ERYTHROCYTE [DISTWIDTH] IN BLOOD BY AUTOMATED COUNT: 14.6 % (ref 11.9–14.6)
GLOBULIN SER CALC-MCNC: 3.5 G/DL (ref 2.3–3.5)
GLUCOSE SERPL-MCNC: 76 MG/DL (ref 65–100)
GLUCOSE UR STRIP.AUTO-MCNC: NEGATIVE MG/DL
HCT VFR BLD AUTO: 34.2 % (ref 35.8–46.3)
HGB BLD-MCNC: 10.6 G/DL (ref 11.7–15.4)
HGB UR QL STRIP: ABNORMAL
KETONES UR QL STRIP.AUTO: NEGATIVE MG/DL
LEUKOCYTE ESTERASE UR QL STRIP.AUTO: ABNORMAL
MCH RBC QN AUTO: 29.9 PG (ref 26.1–32.9)
MCHC RBC AUTO-ENTMCNC: 31 G/DL (ref 31.4–35)
MCV RBC AUTO: 96.6 FL (ref 79.6–97.8)
NITRITE UR QL STRIP.AUTO: NEGATIVE
NRBC # BLD: 0 K/UL (ref 0–0.2)
PH UR STRIP: 8 [PH] (ref 5–9)
PLATELET # BLD AUTO: 391 K/UL (ref 150–450)
PMV BLD AUTO: 10.4 FL (ref 9.4–12.3)
POTASSIUM SERPL-SCNC: 4.2 MMOL/L (ref 3.5–5.1)
PROT SERPL-MCNC: 7.3 G/DL (ref 6.3–8.2)
PROT UR STRIP-MCNC: 30 MG/DL
RBC # BLD AUTO: 3.54 M/UL (ref 4.05–5.2)
RBC #/AREA URNS HPF: ABNORMAL /HPF
SODIUM SERPL-SCNC: 142 MMOL/L (ref 136–145)
SP GR UR REFRACTOMETRY: 1.03 (ref 1–1.02)
UROBILINOGEN UR QL STRIP.AUTO: 0.2 EU/DL (ref 0.2–1)
WBC # BLD AUTO: 6.8 K/UL (ref 4.3–11.1)
WBC URNS QL MICRO: >100 /HPF

## 2020-02-18 PROCEDURE — 81001 URINALYSIS AUTO W/SCOPE: CPT

## 2020-02-18 PROCEDURE — 99218 HC RM OBSERVATION: CPT

## 2020-02-18 PROCEDURE — 74011000250 HC RX REV CODE- 250: Performed by: EMERGENCY MEDICINE

## 2020-02-18 PROCEDURE — 72193 CT PELVIS W/DYE: CPT

## 2020-02-18 PROCEDURE — 36415 COLL VENOUS BLD VENIPUNCTURE: CPT

## 2020-02-18 PROCEDURE — 96365 THER/PROPH/DIAG IV INF INIT: CPT

## 2020-02-18 PROCEDURE — 96376 TX/PRO/DX INJ SAME DRUG ADON: CPT

## 2020-02-18 PROCEDURE — 74011250636 HC RX REV CODE- 250/636: Performed by: EMERGENCY MEDICINE

## 2020-02-18 PROCEDURE — 85027 COMPLETE CBC AUTOMATED: CPT

## 2020-02-18 PROCEDURE — 74011250637 HC RX REV CODE- 250/637: Performed by: OBSTETRICS & GYNECOLOGY

## 2020-02-18 PROCEDURE — 74011636320 HC RX REV CODE- 636/320: Performed by: OBSTETRICS & GYNECOLOGY

## 2020-02-18 PROCEDURE — 74011250636 HC RX REV CODE- 250/636: Performed by: OBSTETRICS & GYNECOLOGY

## 2020-02-18 PROCEDURE — 74011000258 HC RX REV CODE- 258: Performed by: EMERGENCY MEDICINE

## 2020-02-18 PROCEDURE — 96375 TX/PRO/DX INJ NEW DRUG ADDON: CPT

## 2020-02-18 PROCEDURE — 80053 COMPREHEN METABOLIC PANEL: CPT

## 2020-02-18 PROCEDURE — 99284 EMERGENCY DEPT VISIT MOD MDM: CPT

## 2020-02-18 PROCEDURE — 74011000258 HC RX REV CODE- 258: Performed by: OBSTETRICS & GYNECOLOGY

## 2020-02-18 PROCEDURE — 96366 THER/PROPH/DIAG IV INF ADDON: CPT

## 2020-02-18 RX ORDER — DULOXETIN HYDROCHLORIDE 60 MG/1
60 CAPSULE, DELAYED RELEASE ORAL 2 TIMES DAILY
Status: DISCONTINUED | OUTPATIENT
Start: 2020-02-18 | End: 2020-02-20 | Stop reason: HOSPADM

## 2020-02-18 RX ORDER — ONDANSETRON 2 MG/ML
4 INJECTION INTRAMUSCULAR; INTRAVENOUS
Status: DISCONTINUED | OUTPATIENT
Start: 2020-02-18 | End: 2020-02-20 | Stop reason: HOSPADM

## 2020-02-18 RX ORDER — PREGABALIN 150 MG/1
150 CAPSULE ORAL 2 TIMES DAILY
Status: DISCONTINUED | OUTPATIENT
Start: 2020-02-18 | End: 2020-02-20 | Stop reason: HOSPADM

## 2020-02-18 RX ORDER — DIPHENHYDRAMINE HCL 25 MG
25 CAPSULE ORAL
Status: DISCONTINUED | OUTPATIENT
Start: 2020-02-18 | End: 2020-02-20 | Stop reason: HOSPADM

## 2020-02-18 RX ORDER — NALOXONE HYDROCHLORIDE 0.4 MG/ML
0.4 INJECTION, SOLUTION INTRAMUSCULAR; INTRAVENOUS; SUBCUTANEOUS ONCE
Status: ACTIVE | OUTPATIENT
Start: 2020-02-18 | End: 2020-02-19

## 2020-02-18 RX ORDER — TEMAZEPAM 7.5 MG/1
7.5 CAPSULE ORAL
COMMUNITY
End: 2021-01-19

## 2020-02-18 RX ORDER — PHENTERMINE HYDROCHLORIDE 37.5 MG/1
37.5 CAPSULE ORAL
COMMUNITY
End: 2020-03-18 | Stop reason: SDUPTHER

## 2020-02-18 RX ORDER — ACETAMINOPHEN 500 MG
1000 TABLET ORAL
Status: DISCONTINUED | OUTPATIENT
Start: 2020-02-18 | End: 2020-02-19

## 2020-02-18 RX ORDER — CLONAZEPAM 1 MG/1
1 TABLET ORAL 2 TIMES DAILY
Status: DISCONTINUED | OUTPATIENT
Start: 2020-02-18 | End: 2020-02-20 | Stop reason: HOSPADM

## 2020-02-18 RX ORDER — HYDROMORPHONE HYDROCHLORIDE 1 MG/ML
0.5 INJECTION, SOLUTION INTRAMUSCULAR; INTRAVENOUS; SUBCUTANEOUS
Status: COMPLETED | OUTPATIENT
Start: 2020-02-18 | End: 2020-02-18

## 2020-02-18 RX ORDER — DOCUSATE SODIUM 100 MG/1
100 CAPSULE, LIQUID FILLED ORAL 3 TIMES DAILY
Status: DISCONTINUED | OUTPATIENT
Start: 2020-02-18 | End: 2020-02-18 | Stop reason: SDUPTHER

## 2020-02-18 RX ORDER — PANTOPRAZOLE SODIUM 40 MG/1
40 TABLET, DELAYED RELEASE ORAL
Status: DISCONTINUED | OUTPATIENT
Start: 2020-02-19 | End: 2020-02-20 | Stop reason: HOSPADM

## 2020-02-18 RX ORDER — KETOROLAC TROMETHAMINE 30 MG/ML
30 INJECTION, SOLUTION INTRAMUSCULAR; INTRAVENOUS EVERY 6 HOURS
Status: COMPLETED | OUTPATIENT
Start: 2020-02-18 | End: 2020-02-19

## 2020-02-18 RX ORDER — LORATADINE 10 MG/1
10 TABLET ORAL
Status: DISCONTINUED | OUTPATIENT
Start: 2020-02-18 | End: 2020-02-20 | Stop reason: HOSPADM

## 2020-02-18 RX ORDER — POLYETHYLENE GLYCOL 3350 17 G/17G
17 POWDER, FOR SOLUTION ORAL 2 TIMES DAILY
Status: DISCONTINUED | OUTPATIENT
Start: 2020-02-18 | End: 2020-02-20 | Stop reason: HOSPADM

## 2020-02-18 RX ORDER — SODIUM CHLORIDE 0.9 % (FLUSH) 0.9 %
10 SYRINGE (ML) INJECTION
Status: COMPLETED | OUTPATIENT
Start: 2020-02-18 | End: 2020-02-18

## 2020-02-18 RX ORDER — KETOROLAC TROMETHAMINE 30 MG/ML
30 INJECTION, SOLUTION INTRAMUSCULAR; INTRAVENOUS
Status: COMPLETED | OUTPATIENT
Start: 2020-02-18 | End: 2020-02-18

## 2020-02-18 RX ORDER — TEMAZEPAM 7.5 MG/1
7.5 CAPSULE ORAL
Status: DISCONTINUED | OUTPATIENT
Start: 2020-02-18 | End: 2020-02-18 | Stop reason: RX

## 2020-02-18 RX ORDER — LANOLIN ALCOHOL/MO/W.PET/CERES
400 CREAM (GRAM) TOPICAL
Status: DISCONTINUED | OUTPATIENT
Start: 2020-02-18 | End: 2020-02-20 | Stop reason: HOSPADM

## 2020-02-18 RX ORDER — DOCUSATE SODIUM 100 MG/1
100 CAPSULE, LIQUID FILLED ORAL 2 TIMES DAILY
Status: DISCONTINUED | OUTPATIENT
Start: 2020-02-18 | End: 2020-02-20 | Stop reason: HOSPADM

## 2020-02-18 RX ORDER — OXYCODONE HYDROCHLORIDE 5 MG/1
5-10 TABLET ORAL
Status: DISCONTINUED | OUTPATIENT
Start: 2020-02-18 | End: 2020-02-20 | Stop reason: HOSPADM

## 2020-02-18 RX ORDER — FAMOTIDINE 20 MG/1
20 TABLET, FILM COATED ORAL
Status: DISCONTINUED | OUTPATIENT
Start: 2020-02-18 | End: 2020-02-20 | Stop reason: HOSPADM

## 2020-02-18 RX ORDER — SODIUM CHLORIDE, SODIUM LACTATE, POTASSIUM CHLORIDE, CALCIUM CHLORIDE 600; 310; 30; 20 MG/100ML; MG/100ML; MG/100ML; MG/100ML
125 INJECTION, SOLUTION INTRAVENOUS CONTINUOUS
Status: DISCONTINUED | OUTPATIENT
Start: 2020-02-18 | End: 2020-02-20 | Stop reason: HOSPADM

## 2020-02-18 RX ORDER — ZOLPIDEM TARTRATE 5 MG/1
5 TABLET ORAL
Status: DISCONTINUED | OUTPATIENT
Start: 2020-02-18 | End: 2020-02-19

## 2020-02-18 RX ORDER — SODIUM CHLORIDE 0.9 % (FLUSH) 0.9 %
5-40 SYRINGE (ML) INJECTION AS NEEDED
Status: DISCONTINUED | OUTPATIENT
Start: 2020-02-18 | End: 2020-02-20 | Stop reason: HOSPADM

## 2020-02-18 RX ORDER — SODIUM CHLORIDE 0.9 % (FLUSH) 0.9 %
5-40 SYRINGE (ML) INJECTION EVERY 8 HOURS
Status: DISCONTINUED | OUTPATIENT
Start: 2020-02-18 | End: 2020-02-20 | Stop reason: HOSPADM

## 2020-02-18 RX ADMIN — PROMETHAZINE HYDROCHLORIDE 12.5 MG: 25 INJECTION INTRAMUSCULAR; INTRAVENOUS at 12:42

## 2020-02-18 RX ADMIN — PREGABALIN 150 MG: 150 CAPSULE ORAL at 20:03

## 2020-02-18 RX ADMIN — DULOXETINE HYDROCHLORIDE 60 MG: 60 CAPSULE, DELAYED RELEASE ORAL at 20:03

## 2020-02-18 RX ADMIN — KETOROLAC TROMETHAMINE 30 MG: 30 INJECTION, SOLUTION INTRAMUSCULAR at 20:03

## 2020-02-18 RX ADMIN — CLONAZEPAM 1 MG: 1 TABLET ORAL at 20:12

## 2020-02-18 RX ADMIN — HYDROMORPHONE HYDROCHLORIDE 0.5 MG: 1 INJECTION, SOLUTION INTRAMUSCULAR; INTRAVENOUS; SUBCUTANEOUS at 12:41

## 2020-02-18 RX ADMIN — KETOROLAC TROMETHAMINE 30 MG: 30 INJECTION, SOLUTION INTRAMUSCULAR at 15:42

## 2020-02-18 RX ADMIN — ACETAMINOPHEN 1000 MG: 500 TABLET, FILM COATED ORAL at 22:20

## 2020-02-18 RX ADMIN — Medication 10 ML: at 09:47

## 2020-02-18 RX ADMIN — FAMOTIDINE 20 MG: 20 TABLET ORAL at 21:52

## 2020-02-18 RX ADMIN — DOCUSATE SODIUM 100 MG: 100 CAPSULE, LIQUID FILLED ORAL at 20:03

## 2020-02-18 RX ADMIN — IOPAMIDOL 100 ML: 755 INJECTION, SOLUTION INTRAVENOUS at 09:47

## 2020-02-18 RX ADMIN — SODIUM CHLORIDE 100 ML: 900 INJECTION, SOLUTION INTRAVENOUS at 09:47

## 2020-02-18 RX ADMIN — POLYETHYLENE GLYCOL (3350) 17 G: 17 POWDER, FOR SOLUTION ORAL at 20:04

## 2020-02-18 RX ADMIN — SODIUM CHLORIDE 500 ML: 900 INJECTION, SOLUTION INTRAVENOUS at 12:41

## 2020-02-18 RX ADMIN — LORATADINE 10 MG: 10 TABLET ORAL at 21:52

## 2020-02-18 RX ADMIN — DIATRIZOATE MEGLUMINE AND DIATRIZOATE SODIUM 30 ML: 660; 100 LIQUID ORAL; RECTAL at 09:47

## 2020-02-18 RX ADMIN — CEFTRIAXONE 1 G: 1 INJECTION, POWDER, FOR SOLUTION INTRAMUSCULAR; INTRAVENOUS at 15:47

## 2020-02-18 RX ADMIN — Medication 400 MG: at 21:52

## 2020-02-18 RX ADMIN — SODIUM CHLORIDE 1000 ML: 900 INJECTION, SOLUTION INTRAVENOUS at 15:47

## 2020-02-18 RX ADMIN — SODIUM CHLORIDE, SODIUM LACTATE, POTASSIUM CHLORIDE, AND CALCIUM CHLORIDE 125 ML/HR: 600; 310; 30; 20 INJECTION, SOLUTION INTRAVENOUS at 20:41

## 2020-02-18 NOTE — ED NOTES
TRANSFER - OUT REPORT:    Verbal report given to Annia Lind RN on [address]  being transferred to [address] [place] [address] for routine progression of care       Report consisted of patients Situation, Background, Assessment and   Recommendations(SBAR). Information from the following report(s) SBAR was reviewed with the receiving nurse. Lines:   Peripheral IV 02/18/20 Left Hand (Active)   Site Assessment Clean, dry, & intact 2/18/2020 11:30 AM   Phlebitis Assessment 0 2/18/2020 11:30 AM   Infiltration Assessment 0 2/18/2020 11:30 AM   Dressing Status Clean, dry, & intact 2/18/2020 11:30 AM   Hub Color/Line Status Blue 2/18/2020 11:30 AM        Opportunity for questions and clarification was provided.       Patient transported with:   Registered Nurse

## 2020-02-18 NOTE — PROGRESS NOTES
TRANSFER - IN REPORT:    Verbal report received from [facility]) on [address]  being received from ED(unit) for routine progression of care      Report consisted of patients Situation, Background, Assessment and   Recommendations(SBAR). Information from the following report(s) SBAR, Kardex and MAR was reviewed with the receiving nurse. Opportunity for questions and clarification was provided. Assessment completed upon patients arrival to unit and care assumed.

## 2020-02-18 NOTE — DISCHARGE INSTRUCTIONS
Patient Education        Acute Pain After Surgery: Care Instructions  Your Care Instructions    It's common to have some pain after surgery. Pain doesn't mean that something is wrong or that the surgery didn't go well. But when the pain is severe, it's important to work with your doctor to manage it. It's also important to be aware of a few facts about pain and pain medicine. · You are the only person who knows what your pain feels like. So be sure to tell your doctor when you are in pain or when the pain changes. Then he or she will know how to adjust your medicines. · Pain is often easier to control right after it starts. So it may be better to take regular doses of pain medicine and not wait until the pain gets bad. · Medicine can help control pain. But this doesn't mean you'll have no pain. Medicine works to keep the pain at a level you can live with. With time, you will feel better. Follow-up care is a key part of your treatment and safety. Be sure to make and go to all appointments, and call your doctor if you are having problems. It's also a good idea to know your test results and keep a list of the medicines you take. How can you care for yourself at home? · Be safe with medicines. Read and follow all instructions on the label. ? If the doctor gave you a prescription medicine for pain, take it as prescribed. ? If you are not taking a prescription pain medicine, ask your doctor if you can take an over-the-counter medicine. · If you take an over-the-counter pain medicine, such as acetaminophen (Tylenol), ibuprofen (Advil, Motrin), or naproxen (Aleve), read and follow all instructions on the label. · Do not take two or more pain medicines at the same time unless the doctor told you to. · Do not drink alcohol while you are taking pain medicines. · Try to walk each day if your doctor recommends it. Start by walking a little more than you did the day before. Bit by bit, increase the amount you walk. Walking increases blood flow. It also helps prevent pneumonia and constipation. · To prevent constipation from opioid pain medicines:  ? Talk to your doctor about a laxative. ? Include fruits, vegetables, beans, and whole grains in your diet each day. These foods are high in fiber. ? Drink plenty of fluids, enough so that your urine is light yellow or clear like water. Drink water, fruit juice, or other drinks that do not contain caffeine or alcohol. If you have kidney, heart, or liver disease and have to limit fluids, talk with your doctor before you increase the amount of fluids you drink. ? Take a fiber supplement, such as Citrucel or Metamucil, every day if needed. Read and follow all instructions on the label. If you take pain medicine for more than a few days, talk to your doctor before you take fiber. When should you call for help? Call your doctor now or seek immediate medical care if:    · Your pain gets worse.     · Your pain is not controlled by medicine.    Watch closely for changes in your health, and be sure to contact your doctor if you have any problems. Where can you learn more? Go to http://jerad-inocencia.info/. Enter ([phone] [phone] in the search box to learn more about \"Acute Pain After Surgery: Care Instructions. \"  Current as of: June 26, 2019  Content Version: 12.2  © 1321-3170 [employer]. Care instructions adapted under license by [employer] (which disclaims liability or warranty for this information). If you have questions about a medical condition or this instruction, always ask your healthcare professional. [place] [address] any warranty or liability for your use of this information. Patient Education        Urinary Tract Infection in Women: Care Instructions  Your Care Instructions    A urinary tract infection, or UTI, is a general term for an infection anywhere between the kidneys and the urethra (where urine comes out). Most UTIs are bladder infections. They often cause pain or burning when you urinate. UTIs are caused by bacteria and can be cured with antibiotics. Be sure to complete your treatment so that the infection goes away. Follow-up care is a key part of your treatment and safety. Be sure to make and go to all appointments, and call your doctor if you are having problems. It's also a good idea to know your test results and keep a list of the medicines you take. How can you care for yourself at home? · Take your antibiotics as directed. Do not stop taking them just because you feel better. You need to take the full course of antibiotics. · Drink extra water and other fluids for the next day or two. This may help wash out the bacteria that are causing the infection. (If you have kidney, heart, or liver disease and have to limit fluids, talk with your doctor before you increase your fluid intake.)  · Avoid drinks that are carbonated or have caffeine. They can irritate the bladder. · Urinate often. Try to empty your bladder each time. · To relieve pain, take a hot bath or lay a heating pad set on low over your lower belly or genital area. Never go to sleep with a heating pad in place. To prevent UTIs  · Drink plenty of water each day. This helps you urinate often, which clears bacteria from your system. (If you have kidney, heart, or liver disease and have to limit fluids, talk with your doctor before you increase your fluid intake.)  · Urinate when you need to. · Urinate right after you have sex. · Change sanitary pads often. · Avoid douches, bubble baths, feminine hygiene sprays, and other feminine hygiene products that have deodorants. · After going to the bathroom, wipe from front to back. When should you call for help?   Call your doctor now or seek immediate medical care if:    · Symptoms such as fever, chills, nausea, or vomiting get worse or appear for the first time.     · You have new pain in your back just below your rib cage. This is called flank pain.     · There is new blood or pus in your urine.     · You have any problems with your antibiotic medicine.    Watch closely for changes in your health, and be sure to contact your doctor if:    · You are not getting better after taking an antibiotic for 2 days.     · Your symptoms go away but then come back. Where can you learn more? Go to http://jerad-inocencia.info/. Enter W633 in the search box to learn more about \"Urinary Tract Infection in Women: Care Instructions. \"  Current as of: December 19, 2018  Content Version: 12.2  © 0377-0144 [employer]. Care instructions adapted under license by [employer] (which disclaims liability or warranty for this information). If you have questions about a medical condition or this instruction, always ask your healthcare professional. [address] any warranty or liability for your use of this information.

## 2020-02-18 NOTE — ED TRIAGE NOTES
Pt arrived from CT with reports of \"it hurts too much for me to go back home. \" Pt here for CT r/t RLQ abd pain after hysterectomy 2 weeks ago.

## 2020-02-18 NOTE — ED NOTES
Pt extermely anxious when entering the room with IV start kit since 22g in left hand won't draw blood for blood work. Pt started crying and seemed to panic when seeing supplies. Pt also states that she cannot urinate b/c she hasn't drank anything and explained to pt that I need a urine sample and that if she could not urinate then we would have to cath her for the specimen if MD recommends. Pt started crying and very loudly said \" not awake\".

## 2020-02-18 NOTE — CONSULTS
GYN Consult    Subjective: Cony Huffman is a 30 y.o. female. She underwent total laparoscopic hysterectomy with bilateral salpingoopherectomy with Dr. Alex Godwin on 2/5/20. No complications were noted. Wright remained in for about 24 hours. She was stable day 1 until she was found partially unresponsive. All head imaging was negative and conclusion was made that she was most likely overmedicated with narcotics. She was released on day 2. She states she has been having pain for at least 5 days. She chose not to go to ED and kept scheduled appt with Dr. Alex Godwin yesterday. Her exam was negative including a thorough pelvic exam. She did have some urinary symptoms so she was given a short dosage of bactrim. Dr. Alex Godwin did order a CT scan which was done this morning. She reported to the ED right after the CT with worsening pelvic pain. The pain is in the right side to mid abdominal area between the umbilicus and suprapubic bone. She reports some continued vaginal bleeding but she states she will maybe use 4 pads per day. She denies vomiting, diarrhea, fever, chills. She had a bowel movement last night. She has chronic constipation from long term narcotic usage. She does report a severe headache since this am but she has known headaches from prior medical issues.      Past Medical History:   Diagnosis Date    Anemia     no supplement     Anxiety     ASCUS with positive high risk HPV cervical 2014    Asthma     mild per pulmonary note; rescue inhaler     BMI 32.0-32.9,adult     BRCA1 positive 10/2019    Chiari I malformation (HCC)     3 brain surgeries     Chronic pain     Constipation     Depression     GERD (gastroesophageal reflux disease)     on med for control     Heart murmur     \"I think I was born with it\"; last echo 10/29/18    Impaired mobility     walks with braces, cane, and uses a wheelchair occasionally     Migraine     Multiple falls     Nausea & vomiting     post-op N/V; per patient Zofran does not help     Paralysis ([address]     right upper and lower extremities     Postprocedural pseudomeningocele     PTSD (post-traumatic stress disorder)     Seizures (HCC)     non epileptic per patient; per neuro note dated 01/02/19=pseudoseizures; last seizure 2 days ago; on topamax  (noted 01/29/2020)     Stroke [facility])     possible stroke during third brain surgery in 2018     Past Surgical History:   Procedure Laterality Date    HX HEENT      dental implants     HX HYSTERECTOMY      HX ORTHOPAEDIC      left hand    HX OTHER SURGICAL  6/10/16 & 7/04/16 & 1/17/18    Brain surgery    HX TONSIL AND ADENOIDECTOMY        Family History   Problem Relation Age of Onset    Breast Cancer Maternal Grandmother     Heart Disease Maternal Grandmother     Heart Disease Maternal Grandfather     Diabetes Maternal Grandfather     Breast Cancer Paternal Grandmother         + BRCA    Ovarian Cancer Paternal Grandmother     Cancer Paternal Grandmother         pancreatic/liver    Ovarian Cancer Other         great aunt?  Breast Cancer Mother     Colon Cancer Neg Hx      Social History     Socioeconomic History    Marital status:      Spouse name: Not on file    Number of children: Not on file    Years of education: Not on file    Highest education level: Not on file   Tobacco Use    Smoking status: Never Smoker    Smokeless tobacco: Never Used   Substance and Sexual Activity    Alcohol use: Yes     Comment: occasionally    Drug use: No    Sexual activity: Yes     Partners: Male     Birth control/protection: Condom      Current Outpatient Medications   Medication Sig Dispense Refill    phentermine (ADIPEX-P) 37.5 mg capsule Take 37.5 mg by mouth every morning.  temazepam (RESTORIL) 7.5 mg capsule Take 7.5 mg by mouth nightly as needed for Sleep.  trimethoprim-sulfamethoxazole (BACTRIM, SEPTRA)  mg per tablet Take 1 Tab by mouth two (2) times a day for 3 days.  6 Tab 0    meloxicam (MOBIC) 15 mg tablet Take 1 Tab by mouth daily for 10 days. 10 Tab 0    clonazePAM (KLONOPIN) 1 mg tablet Take 1 mg by mouth two (2) times a day. Take / use AM day of surgery  per anesthesia protocols.  lansoprazole (PREVACID) 30 mg capsule Take 30 mg by mouth Daily (before breakfast). Take / use AM day of surgery  per anesthesia protocols.  pregabalin (LYRICA) 150 mg capsule Take 150 mg by mouth two (2) times a day. Take / use AM day of surgery  per anesthesia protocols.  topiramate ER (TROKENDI XR) 25 mg capsule Take 25 mg by mouth daily. Take / use AM day of surgery  per anesthesia protocols.  prucalopride (MOTEGRITY) 2 mg tab Take 2 mg by mouth daily.  polyethylene glycol (MIRALAX) 17 gram/dose powder Take 17 g by mouth two (2) times a day.  BENEFIBER, GUAR GUM, PO Take 3 Tabs by mouth three (3) times daily.  melatonin 5 mg tablet Take 10 mg by mouth nightly.  magnesium oxide 500 mg tab Take 1,000 mg by mouth nightly.  multivitamin (ONE A DAY) tablet Take 1 Tab by mouth daily.  baclofen (LIORESAL) 10 mg tablet Take 10 mg by mouth nightly.  albuterol (PROAIR HFA) 90 mcg/actuation inhaler Take 2 Puffs by inhalation every six (6) hours as needed for Wheezing. Take / use AM day of surgery  per anesthesia protocols if needed. Indications: asthma attack      cetirizine (ZYRTEC) 10 mg tablet Take 10 mg by mouth nightly.  ascorbic acid (VITAMIN C PO) Take 1 Tab by mouth daily.  famotidine (PEPCID) 20 mg tablet Take 20 mg by mouth nightly.  SUMAtriptan (IMITREX) 50 mg tablet Take 50 mg by mouth once as needed for Migraine.  baclofen 5 mg tab Take 5 mg by mouth two (2) times a day. Take / use AM day of surgery  per anesthesia protocols.  DULoxetine (CYMBALTA) 60 mg capsule Take 60 mg by mouth two (2) times a day. Take / use AM day of surgery  per anesthesia protocols.       docusate sodium (COLACE) 100 mg capsule Take 100 mg by mouth three (3) times daily.  naloxegol (MOVANTIK) 25 mg tab tablet Take  by mouth Daily (before breakfast).  oxyCODONE ER (XTAMPZA ER) 18 mg ER capsule Take 18 mg by mouth every twelve (12) hours. Take / use AM day of surgery  per anesthesia protocols.  cyanocobalamin 1,000 mcg tablet Take 1,000 mcg by mouth daily. Allergies   Allergen Reactions    Amoxicillin Rash    Erythromycin Rash    Pcn [Penicillins] Rash       Review of Systems:  Pertinent items are noted in the History of Present Illness. Objective:        Patient Vitals for the past 8 hrs:   BP Temp Pulse Resp SpO2 Height Weight   20 1120 124/76 98.3 °F (36.8 °C) 95 18 100 % 5' 4\" (1.626 m) 192 lb (87.1 kg)       Temp (24hrs), Av.3 °F (36.8 °C), Min:98.3 °F (36.8 °C), Max:98.3 °F (36.8 °C)      Physical Exam:  GENERAL: alert, cooperative, mild distress, appears stated age, EYE: negative, LUNG: clear to auscultation bilaterally, HEART: regular rate and rhythm, S1, S2 normal, no murmur, click, rub or gallop, ABDOMEN: soft and nondistended. She does have pain with deep palpation but it seems like she can be distracted at times.  There is no rebound pain; I could not hear bowel sounds EXTREMITIES:  extremities normal, atraumatic, no cyanosis or edema, SKIN: Normal., NEUROLOGIC: she has long standing right sided weakness from a prior stroke, PSYCHIATRIC: anxious  Flank: no CVA tenderness  Pelvic: deferred since a thorough exam was done yesterday    Recent Results (from the past 24 hour(s))   CBC W/O DIFF    Collection Time: 20 12:34 PM   Result Value Ref Range    WBC 6.8 4.3 - 11.1 K/uL    RBC 3.54 (L) 4.05 - 5.2 M/uL    HGB 10.6 (L) 11.7 - 15.4 g/dL    HCT 34.2 (L) 35.8 - 46.3 %    MCV 96.6 79.6 - 97.8 FL    MCH 29.9 26.1 - 32.9 PG    MCHC 31.0 (L) 31.4 - 35.0 g/dL    RDW 14.6 11.9 - 14.6 %    PLATELET 293 835 - 472 K/uL    MPV 10.4 9.4 - 12.3 FL    ABSOLUTE NRBC 0.00 0.0 - 0.2 K/uL   METABOLIC PANEL, COMPREHENSIVE    Collection Time: 02/18/20  2:02 PM   Result Value Ref Range    Sodium 142 136 - 145 mmol/L    Potassium 4.2 3.5 - 5.1 mmol/L    Chloride 112 (H) 98 - 107 mmol/L    CO2 25 21 - 32 mmol/L    Anion gap 5 (L) 7 - 16 mmol/L    Glucose 76 65 - 100 mg/dL    BUN 18 6 - 23 MG/DL    Creatinine 0.93 0.6 - 1.0 MG/DL    GFR est AA >60 >60 ml/min/1.73m2    GFR est non-AA >60 >60 ml/min/1.73m2    Calcium 9.2 8.3 - 10.4 MG/DL    Bilirubin, total 0.3 0.2 - 1.1 MG/DL    ALT (SGPT) 23 12 - 65 U/L    AST (SGOT) 14 (L) 15 - 37 U/L    Alk. phosphatase 102 50 - 136 U/L    Protein, total 7.3 6.3 - 8.2 g/dL    Albumin 3.8 3.5 - 5.0 g/dL    Globulin 3.5 2.3 - 3.5 g/dL    A-G Ratio 1.1 (L) 1.2 - 3.5     URINALYSIS W/ RFLX MICROSCOPIC    Collection Time: 02/18/20  2:19 PM   Result Value Ref Range    Color YELLOW      Appearance TURBID      Specific gravity 1.029 (H) 1.001 - 1.023      pH (UA) 8.0 5.0 - 9.0      Protein 30 (A) NEG mg/dL    Glucose NEGATIVE  mg/dL    Ketone NEGATIVE  NEG mg/dL    Bilirubin NEGATIVE  NEG      Blood LARGE (A) NEG      Urobilinogen 0.2 0.2 - 1.0 EU/dL    Nitrites NEGATIVE  NEG      Leukocyte Esterase LARGE (A) NEG      WBC >100 (H) 0 /hpf    RBC  0 /hpf    Epithelial cells 5-10 0 /hpf    Bacteria TRACE 0 /hpf    Casts 5-10 0 /lpf     CT scan:  FINDINGS: Enteric contrast is present within the colon in nondilated distal  small bowel loops. The appendix is normal in appearance. A small amount of free  pelvic fluid is present and is not unexpected in the postoperative setting. There is no peripherally enhancing abscess or large hematoma. The bladder is  normal in appearance. There are no aggressive osseous lesions. Lucent areas are  present beneath the weightbearing surface of the right femoral head. This may be  secondary to avascular necrosis.   Assessment:     Hospital Problems  Date Reviewed: 2/6/2020          Codes Class Noted POA    * (Principal) Pelvic pain ICD-10-CM: R10.2  ICD-9-CM: BOP2964  2/18/2020 Unknown        Dysuria ICD-10-CM: R30.0  ICD-9-CM: 788.1  2/18/2020 Unknown              Plan: Will admit for observation. Treat with IV toradol for 3 doses and then change to po toradol. Discontinue other antiinflammatories for now. May give small doses of narcotic but she will not be given her chronic narcotic medication. CT is negative and she is having BMs. Unsure why bowel sounds are decreased except for fact that she is chronic opioid patient. There is no sign of bowel perforation or obstruction or ileus on CT scan. May need return to OR but not convinced it's best plan at this time. Discussed above with Dr. Karan Roberson.      Kerrie Charles MD  5:01 PM

## 2020-02-18 NOTE — ED PROVIDER NOTES
78-year-old female is almost 2 weeks status post hysterectomy with both ovaries removed. She has had some persistent postoperative pain with a small amount of bleeding over the last week. She has had some dysuria. No fever chills or vomiting or diarrhea. She does have some nausea. Pain does radiate some to the back. No numbness or weakness. Says she has had some slight bleeding. She saw her OB/GYN doctor yesterday who placed her on antibiotic for cystitis and also some change in her pain medication. States pain slightly to the right hip but does not really have any change in her pain with ambulation or any particular difficulties. Due to her Chiari malformations and multiple surgeries she does not walk with a usual gait and always has a slight amount of pain but no change in her right leg pain. The history is provided by the patient. Abdominal Pain    This is a new problem. The current episode started more than 1 week ago. The problem occurs constantly. The problem has not changed since onset. The pain is located in the suprapubic region and RLQ. The quality of the pain is aching and dull. The pain is moderate. Associated symptoms include nausea, dysuria and frequency. Pertinent negatives include no fever, no diarrhea, no hematochezia, no melena, no vomiting, no constipation, no hematuria, no chest pain and no back pain. Nothing worsens the pain. The pain is relieved by nothing. The patient's surgical history includes hysterectomy.        Past Medical History:   Diagnosis Date    Anemia     no supplement     Anxiety     ASCUS with positive high risk HPV cervical 2014    Asthma     mild per pulmonary note; rescue inhaler     BMI 32.0-32.9,adult     BRCA1 positive 10/2019    Chiari I malformation (HCC)     3 brain surgeries     Chronic pain     Constipation     Depression     GERD (gastroesophageal reflux disease)     on med for control     Heart murmur     \"I think I was born with it\"; last echo 10/29/18    Impaired mobility     walks with braces, cane, and uses a wheelchair occasionally     Migraine     Multiple falls     Nausea & vomiting     post-op N/V; per patient Zofran does not help     Paralysis ([address]     right upper and lower extremities     Postprocedural pseudomeningocele     PTSD (post-traumatic stress disorder)     Seizures (HCC)     non epileptic per patient; per neuro note dated 01/02/19=pseudoseizures; last seizure 2 days ago; on topamax  (noted 01/29/2020)     Stroke ([address]     possible stroke during third brain surgery in 2018       Past Surgical History:   Procedure Laterality Date    HX HEENT      dental implants     HX HYSTERECTOMY      HX ORTHOPAEDIC      left hand    HX OTHER SURGICAL  6/10/16 & 7/04/16 & 1/17/18    Brain surgery    HX TONSIL AND ADENOIDECTOMY           Family History:   Problem Relation Age of Onset    Breast Cancer Maternal Grandmother     Heart Disease Maternal Grandmother     Heart Disease Maternal Grandfather     Diabetes Maternal Grandfather     Breast Cancer Paternal Grandmother         + BRCA    Ovarian Cancer Paternal Grandmother     Cancer Paternal Grandmother         pancreatic/liver    Ovarian Cancer Other         great aunt?     Breast Cancer Mother     Colon Cancer Neg Hx        Social History     Socioeconomic History    Marital status:      Spouse name: Not on file    Number of children: Not on file    Years of education: Not on file    Highest education level: Not on file   Occupational History    Not on file   Social Needs    Financial resource strain: Not on file    Food insecurity:     Worry: Not on file     Inability: Not on file    Transportation needs:     Medical: Not on file     Non-medical: Not on file   Tobacco Use    Smoking status: Never Smoker    Smokeless tobacco: Never Used   Substance and Sexual Activity    Alcohol use: Yes     Comment: occasionally    Drug use: No    Sexual activity: Yes Partners: Male     Birth control/protection: Condom   Lifestyle    Physical activity:     Days per week: Not on file     Minutes per session: Not on file    Stress: Not on file   Relationships    Social connections:     Talks on phone: Not on file     Gets together: Not on file     Attends Mosque service: Not on file     Active member of club or organization: Not on file     Attends meetings of clubs or organizations: Not on file     Relationship status: Not on file    Intimate partner violence:     Fear of current or ex partner: Not on file     Emotionally abused: Not on file     Physically abused: Not on file     Forced sexual activity: Not on file   Other Topics Concern    Not on file   Social History Narrative    Not on file         ALLERGIES: Amoxicillin; Erythromycin; and Pcn [penicillins]    Review of Systems   Constitutional: Negative for chills and fever. Respiratory: Negative for shortness of breath. Cardiovascular: Negative for chest pain. Gastrointestinal: Positive for abdominal pain and nausea. Negative for constipation, diarrhea, hematochezia, melena and vomiting. Genitourinary: Positive for dysuria and frequency. Negative for hematuria. Musculoskeletal: Negative for back pain. Vitals:    02/18/20 1120   BP: 124/76   Pulse: 95   Resp: 18   Temp: 98.3 °F (36.8 °C)   SpO2: 100%   Weight: 87.1 kg (192 lb)   Height: 5' 4\" (1.626 m)            Physical Exam  Vitals signs and nursing note reviewed. Constitutional:       General: She is not in acute distress. Appearance: She is well-developed. HENT:      Head: Normocephalic and atraumatic. Right Ear: External ear normal.      Left Ear: External ear normal.      Mouth/Throat:      Pharynx: No oropharyngeal exudate. Eyes:      Conjunctiva/sclera: Conjunctivae normal.      Pupils: Pupils are equal, round, and reactive to light. Neck:      Musculoskeletal: Normal range of motion and neck supple.    Cardiovascular: Rate and Rhythm: Normal rate and regular rhythm. Heart sounds: No murmur. Pulmonary:      Effort: No respiratory distress. Breath sounds: Normal breath sounds. Abdominal:      General: Bowel sounds are normal.      Palpations: Abdomen is soft. There is no mass. Tenderness: There is abdominal tenderness in the right lower quadrant and suprapubic area. There is no right CVA tenderness, left CVA tenderness, guarding or rebound. Hernia: No hernia is present. Musculoskeletal:      Right hip: She exhibits normal range of motion, no tenderness and no bony tenderness. Skin:     General: Skin is warm and dry. Neurological:      Mental Status: She is alert and oriented to person, place, and time. Gait: Gait normal.      Comments: Nl speech   Psychiatric:         Speech: Speech normal.          MDM  Number of Diagnoses or Management Options  Diagnosis management comments: No mass or rebound on abdominal exam.  No pain with heel tap. No obvious psoas or obturator sign. Incisions look clear. Check blood work to see if there is any evidence for anemia or infection. Patient had CT scan done prior to emergency department visit today. It revealed minimal postoperative fluid without any obvious evidence for complications. There was a suggestion of avascular necrosis of the right hip. Patient's pain does not seem to be centered in the hip discussed with OB/GYN after lab work results back.          Procedures        Results Include:    Recent Results (from the past 24 hour(s))   CBC W/O DIFF    Collection Time: 02/18/20 12:34 PM   Result Value Ref Range    WBC 6.8 4.3 - 11.1 K/uL    RBC 3.54 (L) 4.05 - 5.2 M/uL    HGB 10.6 (L) 11.7 - 15.4 g/dL    HCT 34.2 (L) 35.8 - 46.3 %    MCV 96.6 79.6 - 97.8 FL    MCH 29.9 26.1 - 32.9 PG    MCHC 31.0 (L) 31.4 - 35.0 g/dL    RDW 14.6 11.9 - 14.6 %    PLATELET 893 434 - 722 K/uL    MPV 10.4 9.4 - 12.3 FL    ABSOLUTE NRBC 0.00 0.0 - 0.2 K/uL   METABOLIC PANEL, COMPREHENSIVE    Collection Time: 02/18/20  2:02 PM   Result Value Ref Range    Sodium 142 136 - 145 mmol/L    Potassium 4.2 3.5 - 5.1 mmol/L    Chloride 112 (H) 98 - 107 mmol/L    CO2 25 21 - 32 mmol/L    Anion gap 5 (L) 7 - 16 mmol/L    Glucose 76 65 - 100 mg/dL    BUN 18 6 - 23 MG/DL    Creatinine 0.93 0.6 - 1.0 MG/DL    GFR est AA >60 >60 ml/min/1.73m2    GFR est non-AA >60 >60 ml/min/1.73m2    Calcium 9.2 8.3 - 10.4 MG/DL    Bilirubin, total 0.3 0.2 - 1.1 MG/DL    ALT (SGPT) 23 12 - 65 U/L    AST (SGOT) 14 (L) 15 - 37 U/L    Alk. phosphatase 102 50 - 136 U/L    Protein, total 7.3 6.3 - 8.2 g/dL    Albumin 3.8 3.5 - 5.0 g/dL    Globulin 3.5 2.3 - 3.5 g/dL    A-G Ratio 1.1 (L) 1.2 - 3.5       Ct Pelv W Cont    Result Date: 2/18/2020  CT PELVIS WITH CONTRAST 2/18/2020 HISTORY: Right-sided pelvic pain status post hysterectomy 2 weeks ago. TECHNIQUE: The patient received oral contrast and 100 mL Isovue-370 nonionic IV contrast. Axial images were obtained through the pelvis. Coronal reformatted images were generated. All CT scans at this facility used dose modulation, interactive reconstruction and/or weight based dosing when appropriate to reduce radiation dose to as low as reasonably achievable. COMPARISON: None FINDINGS: Enteric contrast is present within the colon in nondilated distal small bowel loops. The appendix is normal in appearance. A small amount of free pelvic fluid is present and is not unexpected in the postoperative setting. There is no peripherally enhancing abscess or large hematoma. The bladder is normal in appearance. There are no aggressive osseous lesions. Lucent areas are present beneath the weightbearing surface of the right femoral head. This may be secondary to avascular necrosis. IMPRESSION: 1. Trace amount of free pelvic fluid status post hysterectomy. 2. Possible AVN of right hip.    3:05 PM  Poke with the OB/GYN doctor on for the patient's surgeon.   She will stop by and see the patient. Spoke with patient's orthopedist.  They will call the patient for follow-up appointment. Patient may have UTI but was clean-catch. Seems to have pain out of proportion for UTI. [facility] on-call coming to see the patient.     Patient receives narcotics from pain management due to chronic neck and back and leg issues from her Chiari malformations

## 2020-02-19 ENCOUNTER — APPOINTMENT (OUTPATIENT)
Dept: MRI IMAGING | Age: 30
End: 2020-02-19
Attending: PHYSICIAN ASSISTANT
Payer: COMMERCIAL

## 2020-02-19 LAB
BASOPHILS # BLD: 0.1 K/UL (ref 0–0.2)
BASOPHILS NFR BLD: 1 % (ref 0–2)
DIFFERENTIAL METHOD BLD: ABNORMAL
EOSINOPHIL # BLD: 0.1 K/UL (ref 0–0.8)
EOSINOPHIL NFR BLD: 2 % (ref 0.5–7.8)
ERYTHROCYTE [DISTWIDTH] IN BLOOD BY AUTOMATED COUNT: 13.9 % (ref 11.9–14.6)
HCT VFR BLD AUTO: 33.1 % (ref 35.8–46.3)
HGB BLD-MCNC: 10.3 G/DL (ref 11.7–15.4)
IMM GRANULOCYTES # BLD AUTO: 0 K/UL (ref 0–0.5)
IMM GRANULOCYTES NFR BLD AUTO: 0 % (ref 0–5)
LYMPHOCYTES # BLD: 2 K/UL (ref 0.5–4.6)
LYMPHOCYTES NFR BLD: 42 % (ref 13–44)
MCH RBC QN AUTO: 30 PG (ref 26.1–32.9)
MCHC RBC AUTO-ENTMCNC: 31.1 G/DL (ref 31.4–35)
MCV RBC AUTO: 96.5 FL (ref 79.6–97.8)
MONOCYTES # BLD: 0.3 K/UL (ref 0.1–1.3)
MONOCYTES NFR BLD: 6 % (ref 4–12)
NEUTS SEG # BLD: 2.3 K/UL (ref 1.7–8.2)
NEUTS SEG NFR BLD: 48 % (ref 43–78)
NRBC # BLD: 0 K/UL (ref 0–0.2)
PLATELET # BLD AUTO: 371 K/UL (ref 150–450)
PMV BLD AUTO: 9.8 FL (ref 9.4–12.3)
RBC # BLD AUTO: 3.43 M/UL (ref 4.05–5.2)
WBC # BLD AUTO: 4.8 K/UL (ref 4.3–11.1)

## 2020-02-19 PROCEDURE — 74011250637 HC RX REV CODE- 250/637: Performed by: OBSTETRICS & GYNECOLOGY

## 2020-02-19 PROCEDURE — 96375 TX/PRO/DX INJ NEW DRUG ADDON: CPT

## 2020-02-19 PROCEDURE — 85025 COMPLETE CBC W/AUTO DIFF WBC: CPT

## 2020-02-19 PROCEDURE — 74011000258 HC RX REV CODE- 258: Performed by: OBSTETRICS & GYNECOLOGY

## 2020-02-19 PROCEDURE — 74011250636 HC RX REV CODE- 250/636: Performed by: OBSTETRICS & GYNECOLOGY

## 2020-02-19 PROCEDURE — 96376 TX/PRO/DX INJ SAME DRUG ADON: CPT

## 2020-02-19 PROCEDURE — 99218 HC RM OBSERVATION: CPT

## 2020-02-19 PROCEDURE — 72158 MRI LUMBAR SPINE W/O & W/DYE: CPT

## 2020-02-19 PROCEDURE — A9575 INJ GADOTERATE MEGLUMI 0.1ML: HCPCS | Performed by: OBSTETRICS & GYNECOLOGY

## 2020-02-19 PROCEDURE — 36415 COLL VENOUS BLD VENIPUNCTURE: CPT

## 2020-02-19 RX ORDER — KETOROLAC TROMETHAMINE 10 MG/1
10 TABLET, FILM COATED ORAL
Status: DISCONTINUED | OUTPATIENT
Start: 2020-02-19 | End: 2020-02-20 | Stop reason: HOSPADM

## 2020-02-19 RX ORDER — SODIUM CHLORIDE 0.9 % (FLUSH) 0.9 %
10 SYRINGE (ML) INJECTION
Status: COMPLETED | OUTPATIENT
Start: 2020-02-19 | End: 2020-02-19

## 2020-02-19 RX ORDER — ACETAMINOPHEN 500 MG
1000 TABLET ORAL EVERY 8 HOURS
Status: DISCONTINUED | OUTPATIENT
Start: 2020-02-19 | End: 2020-02-20 | Stop reason: HOSPADM

## 2020-02-19 RX ORDER — TEMAZEPAM 15 MG/1
15 CAPSULE ORAL
Status: DISCONTINUED | OUTPATIENT
Start: 2020-02-19 | End: 2020-02-20 | Stop reason: HOSPADM

## 2020-02-19 RX ORDER — GADOTERATE MEGLUMINE 376.9 MG/ML
18 INJECTION INTRAVENOUS
Status: COMPLETED | OUTPATIENT
Start: 2020-02-19 | End: 2020-02-19

## 2020-02-19 RX ORDER — CALCIUM CARBONATE 200(500)MG
200 TABLET,CHEWABLE ORAL
Status: DISCONTINUED | OUTPATIENT
Start: 2020-02-19 | End: 2020-02-20 | Stop reason: HOSPADM

## 2020-02-19 RX ADMIN — CEFTRIAXONE 1 G: 1 INJECTION, POWDER, FOR SOLUTION INTRAMUSCULAR; INTRAVENOUS at 15:02

## 2020-02-19 RX ADMIN — LORATADINE 10 MG: 10 TABLET ORAL at 21:32

## 2020-02-19 RX ADMIN — ACETAMINOPHEN 1000 MG: 500 TABLET, FILM COATED ORAL at 05:59

## 2020-02-19 RX ADMIN — CLONAZEPAM 1 MG: 1 TABLET ORAL at 17:29

## 2020-02-19 RX ADMIN — ONDANSETRON 4 MG: 2 INJECTION INTRAMUSCULAR; INTRAVENOUS at 01:04

## 2020-02-19 RX ADMIN — DOCUSATE SODIUM 100 MG: 100 CAPSULE, LIQUID FILLED ORAL at 17:29

## 2020-02-19 RX ADMIN — GADOTERATE MEGLUMINE 18 ML: 376.9 INJECTION INTRAVENOUS at 19:47

## 2020-02-19 RX ADMIN — OXYCODONE 10 MG: 5 TABLET ORAL at 09:34

## 2020-02-19 RX ADMIN — POLYETHYLENE GLYCOL (3350) 17 G: 17 POWDER, FOR SOLUTION ORAL at 09:06

## 2020-02-19 RX ADMIN — Medication 10 ML: at 19:48

## 2020-02-19 RX ADMIN — OXYCODONE 10 MG: 5 TABLET ORAL at 01:04

## 2020-02-19 RX ADMIN — Medication 400 MG: at 21:32

## 2020-02-19 RX ADMIN — SODIUM CHLORIDE, SODIUM LACTATE, POTASSIUM CHLORIDE, AND CALCIUM CHLORIDE 125 ML/HR: 600; 310; 30; 20 INJECTION, SOLUTION INTRAVENOUS at 13:54

## 2020-02-19 RX ADMIN — PREGABALIN 150 MG: 150 CAPSULE ORAL at 09:03

## 2020-02-19 RX ADMIN — DOCUSATE SODIUM 100 MG: 100 CAPSULE, LIQUID FILLED ORAL at 09:03

## 2020-02-19 RX ADMIN — ANTACID TABLETS 200 MG: 500 TABLET, CHEWABLE ORAL at 22:58

## 2020-02-19 RX ADMIN — DULOXETINE HYDROCHLORIDE 60 MG: 60 CAPSULE, DELAYED RELEASE ORAL at 17:29

## 2020-02-19 RX ADMIN — PREGABALIN 150 MG: 150 CAPSULE ORAL at 17:29

## 2020-02-19 RX ADMIN — PANTOPRAZOLE SODIUM 40 MG: 40 TABLET, DELAYED RELEASE ORAL at 09:03

## 2020-02-19 RX ADMIN — KETOROLAC TROMETHAMINE 30 MG: 30 INJECTION, SOLUTION INTRAMUSCULAR at 02:00

## 2020-02-19 RX ADMIN — TEMAZEPAM 15 MG: 15 CAPSULE ORAL at 22:58

## 2020-02-19 RX ADMIN — Medication 5 ML: at 15:00

## 2020-02-19 RX ADMIN — Medication 10 ML: at 22:02

## 2020-02-19 RX ADMIN — FAMOTIDINE 20 MG: 20 TABLET ORAL at 21:32

## 2020-02-19 RX ADMIN — KETOROLAC TROMETHAMINE 10 MG: 10 TABLET, FILM COATED ORAL at 17:29

## 2020-02-19 RX ADMIN — DULOXETINE HYDROCHLORIDE 60 MG: 60 CAPSULE, DELAYED RELEASE ORAL at 09:03

## 2020-02-19 RX ADMIN — ACETAMINOPHEN 1000 MG: 500 TABLET, FILM COATED ORAL at 13:51

## 2020-02-19 RX ADMIN — ACETAMINOPHEN 1000 MG: 500 TABLET, FILM COATED ORAL at 21:32

## 2020-02-19 RX ADMIN — CLONAZEPAM 1 MG: 1 TABLET ORAL at 09:03

## 2020-02-19 NOTE — PROGRESS NOTES
02/19/20 0104   Pain 1   Pain Scale 1 Numeric (0 - 10)   Pain Intensity 1 5   Pain Onset 1 chroic   Pain Location 1 Head   Pain Orientation 1 Anterior   Pain Description 1 Aching; Throbbing   Pain Intervention(s) 1 Medication (see MAR)   Roxicodone 10 mg po given

## 2020-02-19 NOTE — PROGRESS NOTES
This note will not be viewable in [address] Gynecology Progress Note    Patient admitted yesterday with RLQ pain. Seen two days ago in office with same, also sx of UTI (prob from catheter) which was treated empirically (patient had already voided). CT was ordered had been obtained yesterday morning and is WNL except AVN in the right hip. However patient states pain was increasing and thus went to ED. Voiding without difficulty. Patient is passing flatus. Reports a few BMs in the past 2-3 days. Reports h/o orthopedic problems, sees [facility] [place] and has been getting injections in the right hip. States this has been due to problems in the right knee however a review in [facility] office notes from [facility] indicates that she has been having right sided hip pain. Voiding without difficulty. States UOP increasing. Dysuria improving. Patient is passing flatus. Reports a few BMs in the past 2-3 days. Has had some minimal vag bleeding in the past few days, exam  unremarkable and cuff fully intact. Did have a headache for which she was received tylenol which has helped. Vitals:  Blood pressure 118/78, pulse 78, temperature 97.6 °F (36.4 °C), resp. rate 18, height 5' 4\" (1.626 m), weight 192 lb (87.1 kg), last menstrual period 2020, SpO2 96 %. Temp (24hrs), Av.2 °F (36.8 °C), Min:97.6 °F (36.4 °C), Max:98.5 °F (36.9 °C)        Exam:  Patient without distress.  at bedside   A&Ox3, PERRLA, EOMI               Abdomen soft,  Mildly tender RLQ but no guarding or rebound. .                 Incision dry and clean without erythema x 3. Healing appropriate post op bruising, improved from my exam 2 days ago. Lower extremities are negative for evidence of DVT.   \  Lab/Data Review:  CMP:   Lab Results   Component Value Date/Time     2020 02:02 PM    K 4.2 2020 02:02 PM     (H) 2020 02:02 PM    CO2 25 2020 02:02 PM    AGAP 5 (L) 2020 02:02 PM GLU 76 02/18/2020 02:02 PM    BUN 18 02/18/2020 02:02 PM    CREA 0.93 02/18/2020 02:02 PM    GFRAA >60 02/18/2020 02:02 PM    GFRNA >60 02/18/2020 02:02 PM    CA 9.2 02/18/2020 02:02 PM    ALB 3.8 02/18/2020 02:02 PM    TP 7.3 02/18/2020 02:02 PM    GLOB 3.5 02/18/2020 02:02 PM    AGRAT 1.1 (L) 02/18/2020 02:02 PM    SGOT 14 (L) 02/18/2020 02:02 PM    ALT 23 02/18/2020 02:02 PM     CBC:   Lab Results   Component Value Date/Time    WBC 4.8 02/19/2020 04:54 AM    HGB 10.3 (L) 02/19/2020 04:54 AM    HCT 33.1 (L) 02/19/2020 04:54 AM     02/19/2020 04:54 AM     UA + LE/blood but also was VERY concentrated, sg was 1.029      CT yesterday:  \"FINDINGS: Enteric contrast is present within the colon in nondilated distal  small bowel loops. The appendix is normal in appearance. A small amount of free  pelvic fluid is present and is not unexpected in the postoperative setting. There is no peripherally enhancing abscess or large hematoma. The bladder is  normal in appearance. There are no aggressive osseous lesions. Lucent areas are  present beneath the weightbearing surface of the right femoral head. This may be  secondary to avascular necrosis.     IMPRESSION  IMPRESSION:     1. Trace amount of free pelvic fluid status post hysterectomy.     2. Possible AVN of right hip. \"      Assessment and Plan:  1. Right sided pain, does not appear to be post op related, except prob UTI for which she has now received a few doses of Abx.   2. Chronic pain, we have had difficulty finding good regimen post op but according to her and her  the toradol/norco 5 was a good combo. However has been taking tylenol for HA so will try scheduled tylenol with prn toradol and bernard 5mg. 3. AVN - likely chronic, has appt with [facility] tomorrow but has asked to see ortho here today if possible. Consult placed. 4. Attempt to consult [facility] but apparently inpatient pain mgmt consults not available.   Hope to discharge home today or tomorrow

## 2020-02-19 NOTE — PROGRESS NOTES
In bed resting with ice pack on head. Spouse at bedside. No distress observed.  Report given to on coming RN

## 2020-02-19 NOTE — PROGRESS NOTES
Problem: Falls - Risk of  Goal: *Absence of Falls  Description  Document Darwin Flores Fall Risk and appropriate interventions in the flowsheet.   Outcome: Progressing Towards Goal  Note: Fall Risk Interventions:  Mobility Interventions: Communicate number of staff needed for ambulation/transfer, Patient to call before getting OOB         Medication Interventions: Evaluate medications/consider consulting pharmacy, Patient to call before getting OOB, Teach patient to arise slowly    Elimination Interventions: Call light in reach, Patient to call for help with toileting needs              Problem: Patient Education: Go to Patient Education Activity  Goal: Patient/Family Education  Outcome: Progressing Towards Goal     Problem: Pain  Goal: *Control of Pain  Outcome: Progressing Towards Goal

## 2020-02-19 NOTE — PROGRESS NOTES
Patient alert but drowsy. Able to open eyes spontaneously and follows commands. Speech is delayed. Bilateral lower extremities weak. Puncture sites to abdomen clean, dry, and intact. Active bowel sounds. Spouse at bedside. Complaint of constant headache which is on scheduled medication for. Denies any pain. Side rails up x 2. Bed in low position. Call light in reach.

## 2020-02-19 NOTE — PROGRESS NOTES
No new complaints overnight. Spouse at bedside. Asking about meds ordered for pain/antibiotics. Gentle IVF in place. Pt estella some diet and drinking plenty of fluids. States that there is burning with urination, and she is still having some vaginal bleeding since hysterectomy. Oxycodone given for c/o moderate pain.

## 2020-02-19 NOTE — PROGRESS NOTES
02/18/20 2200   Pain 1   Pain Scale 1 Numeric (0 - 10)   Pain Intensity 1 8   Pain Onset 1 chronic   Pain Location 1 Head   Pain Orientation 1 Anterior   Pain Description 1 Aching;Constant; Throbbing   Pain Intervention(s) 1 Medication (see MAR)   Tylenol 1000 mg po given

## 2020-02-19 NOTE — PROGRESS NOTES
02/19/20 0602   Pain 1   Pain Scale 1 Numeric (0 - 10)   Pain Intensity 1 7   Pain Onset 1 chronic   Pain Location 1 Head   Pain Orientation 1 Anterior   Pain Description 1 Aching;Constant; Throbbing   Pain Intervention(s) 1 Medication (see MAR)   Tylenol 1000 mg po given

## 2020-02-19 NOTE — CONSULTS
[facility]   Consultation Note    Patient ID:  General Alanis  [id number]  64 y.o.  1990    Date of Consultation:  February 19, 2020  Referring Physician:  Dr. Jaydon Wells    Subjective: Pt complains of low back pain. This has been going on since prior to last November. She had a hysterectomy 2 weeks ago and has been recovering from this. Her low back pain has become worse but had worsening abdominal pain. She was evaluated in the ER here and has been see by OB/GYN during this admission. She was found to have possible AVN on CT scan of her abdomen. She has an extensive past medical history that has centered around Chiari I malformation and subsequent brain surgeries. She received high doses of steroids after these surgeries. She has been seen by Dr. Leroy Gallegos for unrelated knee pain at [facility] but she has no interest in following up with him for her hip. She localizes her pain to the lumbar spine. She has long standing history of right sided weakness since her brain surgeries and subsequent complications. She has urinary complaints that have been evaluated by ob/gyn     She has some groin pain that she describes at tightness that feels like a pulled muscle.       She complains of chronic severe headaches     Past Medical History Includes:   Past Medical History:   Diagnosis Date    Anemia     no supplement     Anxiety     ASCUS with positive high risk HPV cervical 2014    Asthma     mild per pulmonary note; rescue inhaler     BMI 32.0-32.9,adult     BRCA1 positive 10/2019    Chiari I malformation (HCC)     3 brain surgeries     Chronic pain     Constipation     Depression     GERD (gastroesophageal reflux disease)     on med for control     Heart murmur     \"I think I was born with it\"; last echo 10/29/18    Impaired mobility     walks with braces, cane, and uses a wheelchair occasionally     Migraine     Multiple falls     Nausea & vomiting     post-op N/V; per patient Zofran does not help     Paralysis ([facility] [address]     right upper and lower extremities     Postprocedural pseudomeningocele     PTSD (post-traumatic stress disorder)     Seizures (HCC)     non epileptic per patient; per neuro note dated 01/02/19=pseudoseizures; last seizure 2 days ago; on topamax  (noted 01/29/2020)     Stroke [facility])     possible stroke during third brain surgery in 2018   ,   Past Surgical History:   Procedure Laterality Date    HX HEENT      dental implants     HX HYSTERECTOMY      HX ORTHOPAEDIC      left hand    HX OTHER SURGICAL  6/10/16 & 7/04/16 & 1/17/18    Brain surgery    HX TONSIL AND ADENOIDECTOMY       Family History:   Family History   Problem Relation Age of Onset    Breast Cancer Maternal Grandmother     Heart Disease Maternal Grandmother     Heart Disease Maternal Grandfather     Diabetes Maternal Grandfather     Breast Cancer Paternal Grandmother         + BRCA    Ovarian Cancer Paternal Grandmother     Cancer Paternal Grandmother         pancreatic/liver    Ovarian Cancer Other         great aunt?     Breast Cancer Mother     Colon Cancer Neg Hx       Social History:   Social History     Tobacco Use    Smoking status: Never Smoker    Smokeless tobacco: Never Used   Substance Use Topics    Alcohol use: Yes     Comment: occasionally       ALLERGIES:   Allergies   Allergen Reactions    Amoxicillin Rash    Erythromycin Rash    Pcn [Penicillins] Rash        Patient Medications    Current Facility-Administered Medications   Medication Dose Route Frequency    acetaminophen (TYLENOL) tablet 1,000 mg  1,000 mg Oral Q8H    ketorolac (TORADOL) tablet 10 mg  10 mg Oral Q6H PRN    loratadine (CLARITIN) tablet 10 mg  10 mg Oral QHS    clonazePAM (KlonoPIN) tablet 1 mg  1 mg Oral BID    DULoxetine (CYMBALTA) capsule 60 mg  60 mg Oral BID    famotidine (PEPCID) tablet 20 mg  20 mg Oral QHS    pantoprazole (PROTONIX) tablet 40 mg  40 mg Oral ACB    magnesium oxide (MAG-OX) tablet 400 mg  400 mg Oral QHS    naloxegol (MOVANTIK) tablet 25 mg (Patient Supplied)  25 mg Oral ACB    polyethylene glycol (MIRALAX) packet 17 g  17 g Oral BID    pregabalin (LYRICA) capsule 150 mg  150 mg Oral BID    lactated Ringers infusion  125 mL/hr IntraVENous CONTINUOUS    sodium chloride (NS) flush 5-40 mL  5-40 mL IntraVENous Q8H    sodium chloride (NS) flush 5-40 mL  5-40 mL IntraVENous PRN    oxyCODONE IR (ROXICODONE) tablet 5-10 mg  5-10 mg Oral Q6H PRN    ondansetron (ZOFRAN) injection 4 mg  4 mg IntraVENous Q4H PRN    diphenhydrAMINE (BENADRYL) capsule 25 mg  25 mg Oral Q4H PRN    docusate sodium (COLACE) capsule 100 mg  100 mg Oral BID    cefTRIAXone (ROCEPHIN) 1 g in 0.9% sodium chloride (MBP/ADV) 50 mL  1 g IntraVENous Q24H    zolpidem (AMBIEN) tablet 5 mg  5 mg Oral QHS PRN    topiramate ER (TROKENDI XR) capsule 100 mg (Patient Supplied)  100 mg Oral DAILY    topiramate ER (TROKENDI XR) capsule 25 mg (Patient Supplied)  25 mg Oral DAILY         Review of Systems:  Pertinent items are noted in HPI. Physical Exam:      General: NAD, Alert, Oriented x 3   Mental Status: Appropriate   Psych: Normal Affect, Normal Mood    HEENT: Normal Cephalic/Atraumatic, PERRL   Lungs: Respirations even and unlabored, Breath Sounds were clear, no respiratory distress   Heart: Regular Rate and Rhythm   Vascular: Distal pulses intact, good capillary refill   Skin: No redness, No Rashes, Skin is dry   Musculoskeletal: exam of both lower extremities reveal no reproducible groin pain on ROM of the right hip. She has tenderness around L4 of her lumbar spine. She has a dramatically positive SLR on the right side.    Lymphatic: No lympahdenopathy, No distal edema   Neuro: No gross deficits   Abdomen: Soft, Non tender, No distension      VITALS:   Patient Vitals for the past 8 hrs:   BP Temp Pulse Resp SpO2   02/19/20 1239 122/86 97.3 °F (36.3 °C) 60 18 99 %   02/19/20 0823 118/78 97.6 °F (36.4 °C) 78 18 96 %    , Temp (24hrs), Av °F (36.7 °C), Min:97.3 °F (36.3 °C), Max:98.5 °F (36.9 °C)         X-ray: reviewed CT scan demonstrating AVN of the right hip. 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 I do not think this is the primary source of her pain during this admission. I recommend follow up with one of our total joint surgeons up discharge     2) Lumbar spine pain and right sided radiculopathy with chronic right sided weakness. Will order MRI of the lumbar spine    3) I recommend that the patient be evaluated by neurosurgery following her MRI. She has appointment with Dr. Marko Osuna. However if there is anything acute on her MRI. She will need to be evaluated during this admission     4) Headache: patient had see a neurologist at [facility] who sepcialized in epilepsy and had a bad experience. She has an appointment to become established with a neurologist affiliated with [facility].  I think it would be appropriate for them to evaluate her while she is admitted given her complex medial history     Bolivar Kebede  2020,  1:59 PM

## 2020-02-19 NOTE — PROGRESS NOTES
02/18/20 2019   Dual Skin Pressure Injury Assessment   Dual Skin Pressure Injury Assessment WDL   Second Care Provider (Based on [address] [place] [address]) Chrsi GOLDBERG   Skin Integumentary   Skin Integumentary (WDL) X   Skin Color Appropriate for ethnicity   Skin Condition/Temp Dry; Warm   Skin Integrity Incision (comment)  (puncture sites abdomen)

## 2020-02-20 VITALS
SYSTOLIC BLOOD PRESSURE: 98 MMHG | BODY MASS INDEX: 32.78 KG/M2 | DIASTOLIC BLOOD PRESSURE: 62 MMHG | OXYGEN SATURATION: 99 % | WEIGHT: 192 LBS | HEART RATE: 73 BPM | HEIGHT: 64 IN | TEMPERATURE: 98.6 F | RESPIRATION RATE: 16 BRPM

## 2020-02-20 PROCEDURE — 74011250637 HC RX REV CODE- 250/637: Performed by: OBSTETRICS & GYNECOLOGY

## 2020-02-20 PROCEDURE — 99218 HC RM OBSERVATION: CPT

## 2020-02-20 PROCEDURE — 74011000258 HC RX REV CODE- 258: Performed by: PSYCHIATRY & NEUROLOGY

## 2020-02-20 PROCEDURE — 74011000250 HC RX REV CODE- 250: Performed by: PSYCHIATRY & NEUROLOGY

## 2020-02-20 PROCEDURE — 96375 TX/PRO/DX INJ NEW DRUG ADDON: CPT

## 2020-02-20 RX ORDER — SUMATRIPTAN 50 MG/1
100 TABLET, FILM COATED ORAL
Status: COMPLETED | OUTPATIENT
Start: 2020-02-20 | End: 2020-02-20

## 2020-02-20 RX ORDER — VALPROATE SODIUM 100 MG/ML
500 INJECTION INTRAVENOUS EVERY 8 HOURS
Status: DISCONTINUED | OUTPATIENT
Start: 2020-02-20 | End: 2020-02-20 | Stop reason: SDUPTHER

## 2020-02-20 RX ADMIN — VALPROATE SODIUM 500 MG: 100 INJECTION, SOLUTION INTRAVENOUS at 11:53

## 2020-02-20 RX ADMIN — CLONAZEPAM 1 MG: 1 TABLET ORAL at 08:50

## 2020-02-20 RX ADMIN — SUMATRIPTAN SUCCINATE 100 MG: 50 TABLET ORAL at 10:35

## 2020-02-20 RX ADMIN — POLYETHYLENE GLYCOL (3350) 17 G: 17 POWDER, FOR SOLUTION ORAL at 08:52

## 2020-02-20 RX ADMIN — PANTOPRAZOLE SODIUM 40 MG: 40 TABLET, DELAYED RELEASE ORAL at 06:47

## 2020-02-20 RX ADMIN — ACETAMINOPHEN 1000 MG: 500 TABLET, FILM COATED ORAL at 14:35

## 2020-02-20 RX ADMIN — OXYCODONE 10 MG: 5 TABLET ORAL at 08:51

## 2020-02-20 RX ADMIN — PREGABALIN 150 MG: 150 CAPSULE ORAL at 08:51

## 2020-02-20 RX ADMIN — DULOXETINE HYDROCHLORIDE 60 MG: 60 CAPSULE, DELAYED RELEASE ORAL at 08:51

## 2020-02-20 RX ADMIN — KETOROLAC TROMETHAMINE 10 MG: 10 TABLET, FILM COATED ORAL at 03:44

## 2020-02-20 RX ADMIN — Medication 5 ML: at 11:54

## 2020-02-20 RX ADMIN — ACETAMINOPHEN 1000 MG: 500 TABLET, FILM COATED ORAL at 06:47

## 2020-02-20 RX ADMIN — DOCUSATE SODIUM 100 MG: 100 CAPSULE, LIQUID FILLED ORAL at 08:51

## 2020-02-20 NOTE — CONSULTS
[facility]  [address] [facility], [address] [facility]          Chief Complaint   Patient presents with    Abdominal Pain       López Neal is a 30 y.o. female who has been hospitalized for management of pelvic pain    She has ongoing complicated neurologic issues from the standpoint of chronic headache, multiple procedures for Chiari anomaly      Past Medical History:   Diagnosis Date    Anemia     no supplement     Anxiety     ASCUS with positive high risk HPV cervical 2014    Asthma     mild per pulmonary note; rescue inhaler     BMI 32.0-32.9,adult     BRCA1 positive 10/2019    Chiari I malformation (HCC)     3 brain surgeries     Chronic pain     Constipation     Depression     GERD (gastroesophageal reflux disease)     on med for control     Heart murmur     \"I think I was born with it\"; last echo 10/29/18    Impaired mobility     walks with braces, cane, and uses a wheelchair occasionally     Migraine     Multiple falls     Nausea & vomiting     post-op N/V; per patient Zofran does not help     Paralysis ([address]     right upper and lower extremities     Postprocedural pseudomeningocele     PTSD (post-traumatic stress disorder)     Seizures (HCC)     non epileptic per patient; per neuro note dated 01/02/19=pseudoseizures; last seizure 2 days ago; on topamax  (noted 01/29/2020)     Stroke ([address]     possible stroke during third brain surgery in 2018       Past Surgical History:   Procedure Laterality Date    HX HEENT      dental implants     HX HYSTERECTOMY      HX ORTHOPAEDIC      left hand    HX OTHER SURGICAL  6/10/16 & 7/04/16 & 1/17/18    Brain surgery    HX TONSIL AND ADENOIDECTOMY         Family History   Problem Relation Age of Onset    Breast Cancer Maternal Grandmother     Heart Disease Maternal Grandmother     Heart Disease Maternal Grandfather     Diabetes Maternal Grandfather     Breast Cancer Paternal Grandmother         + BRCA  Ovarian Cancer Paternal Grandmother     Cancer Paternal Grandmother         pancreatic/liver    Ovarian Cancer Other         great aunt?     Breast Cancer Mother     Colon Cancer Neg Hx        Social History     Socioeconomic History    Marital status:      Spouse name: Not on file    Number of children: Not on file    Years of education: Not on file    Highest education level: Not on file   Tobacco Use    Smoking status: Never Smoker    Smokeless tobacco: Never Used   Substance and Sexual Activity    Alcohol use: Yes     Comment: occasionally    Drug use: No    Sexual activity: Yes     Partners: Male     Birth control/protection: Condom           Current Facility-Administered Medications:     acetaminophen (TYLENOL) tablet 1,000 mg, 1,000 mg, Oral, Q8H, Elmer Ortiz MD, 1,000 mg at 02/20/20 0647    ketorolac (TORADOL) tablet 10 mg, 10 mg, Oral, Q6H PRN, Ethan Chamorro MD, 10 mg at 02/20/20 0344    calcium carbonate (TUMS) chewable tablet 200 mg [elemental], 200 mg, Oral, TID PRN, Edel Randolph MD, 200 mg at 02/19/20 2258    temazepam (RESTORIL) capsule 15 mg, 15 mg, Oral, QHS PRN, Edel Randolph MD, 15 mg at 02/19/20 2258    loratadine (CLARITIN) tablet 10 mg, 10 mg, Oral, QHS, Edel Randolph MD, 10 mg at 02/19/20 2132    clonazePAM (KlonoPIN) tablet 1 mg, 1 mg, Oral, BID, Edel Randolph MD, 1 mg at 02/20/20 0850    DULoxetine (CYMBALTA) capsule 60 mg, 60 mg, Oral, BID, Edel Randolph MD, 60 mg at 02/20/20 5309    famotidine (PEPCID) tablet 20 mg, 20 mg, Oral, QHS, Edel Randolph MD, 20 mg at 02/19/20 2132    pantoprazole (PROTONIX) tablet 40 mg, 40 mg, Oral, ACBEdel MD, 40 mg at 02/20/20 6749    magnesium oxide (MAG-OX) tablet 400 mg, 400 mg, Oral, QHS, Edel Randolph MD, 400 mg at 02/19/20 2132    naloxegol (MOVANTIK) tablet 25 mg (Patient Supplied), 25 mg, Oral, ACB, Edel Randolph MD, 25 mg at 02/20/20 5995    polyethylene glycol (MIRALAX) packet 17 g, 17 g, Oral, BID, Elio Rodriguez MD, 17 g at 02/20/20 8042    pregabalin (LYRICA) capsule 150 mg, 150 mg, Oral, BID, Elio Rodriguez MD, 150 mg at 02/20/20 6782    lactated Ringers infusion, 125 mL/hr, IntraVENous, CONTINUOUS, Elio Rodriguez MD, Last Rate: 125 mL/hr at 02/19/20 1354, 125 mL/hr at 02/19/20 1354    sodium chloride (NS) flush 5-40 mL, 5-40 mL, IntraVENous, Q8H, Elio Rodriguez MD, 10 mL at 02/19/20 2202    sodium chloride (NS) flush 5-40 mL, 5-40 mL, IntraVENous, PRN, Elio Rodriguez MD    oxyCODONE IR (ROXICODONE) tablet 5-10 mg, 5-10 mg, Oral, Q6H PRN, Elio Rodriguez MD, 10 mg at 02/20/20 0851    ondansetron [facility]) injection 4 mg, 4 mg, IntraVENous, Q4H PRN, Elio Rodriguez MD, 4 mg at 02/19/20 0104    diphenhydrAMINE (BENADRYL) capsule 25 mg, 25 mg, Oral, Q4H PRN, Elio Rodriguez MD    docusate sodium (COLACE) capsule 100 mg, 100 mg, Oral, BID, Elio Rodriguez MD, 100 mg at 02/20/20 4426    cefTRIAXone (ROCEPHIN) 1 g in 0.9% sodium chloride (MBP/ADV) 50 mL, 1 g, IntraVENous, Q24H, Elio Rodriguez MD, Last Rate: 100 mL/hr at 02/19/20 1502, 1 g at 02/19/20 1502    topiramate ER (TROKENDI XR) capsule 100 mg (Patient Supplied), 100 mg, Oral, DAILY, Elio Rodriguez MD, 100 mg at 02/19/20 2138    topiramate ER (TROKENDI XR) capsule 25 mg (Patient Supplied), 25 mg, Oral, DAILY, Elio Rodriguez MD, 25 mg at 02/20/20 0900    Allergies   Allergen Reactions    Amoxicillin Rash    Erythromycin Rash    Pcn [Penicillins] Rash       Review of Systems    Visit Vitals  /89 (BP 1 Location: Left arm, BP Patient Position: At rest)   Pulse 72   Temp 97.4 °F (36.3 °C)   Resp 16   Ht 5' 4\" (1.626 m)   Wt 192 lb (87.1 kg)   LMP 01/08/2020 (Approximate)   SpO2 98%   BMI 32.96 kg/m²       Neurologic Exam  Meaningful tele-neurology examination for headache is not feasible.   Funduscopic examination cannot be performed and adequate digital examination of the craniocervical junction shoulder musculature temporalis fossa etc. cannot be performed    Most recent MRI  Results from Hospital Encounter encounter on 02/18/20   MRI LUMB SPINE W WO CONT    Narrative MRI LUMBAR SPINE BEFORE AND AFTER CONTRAST:     CLINICAL HISTORY:  Low back pain with radiculopathy. TECHNIQUE:  Sagittal STIR, as well as sagittal and paraxial T1 and T2-weighted  images were obtained. Sagittal and paraxial fat-suppressed T1-weighted images  were then obtained after injection of 18 cc of MultiHance IV.    COMPARISON:  None. FINDINGS:  Signal from the marrow spaces in the lumbar spine is unremarkable. The conus medullaris lies at the L1 level. No abnormal enhancement is seen  after contrast administration. At L4-5, there is mild diffuse posterior disc bulge without significant central  or foraminal narrowing. At L5-S1, there is a small central disc protrusion which abuts but does not  reflect the proximal traversing S1 nerve roots. No significant central  foraminal narrowing. Impression IMPRESSION:  VERY MILD LOWER LUMBAR SPONDYLOSIS WITHOUT SIGNIFICANT IMPINGEMENT  UPON THE NEURAL ELEMENTS. Most recent MRA  Results from [place] encounter on 02/18/20   MRI LUMB SPINE W WO CONT    Narrative MRI LUMBAR SPINE BEFORE AND AFTER CONTRAST:     CLINICAL HISTORY:  Low back pain with radiculopathy. TECHNIQUE:  Sagittal STIR, as well as sagittal and paraxial T1 and T2-weighted  images were obtained. Sagittal and paraxial fat-suppressed T1-weighted images  were then obtained after injection of 18 cc of MultiHance IV.    COMPARISON:  None. FINDINGS:  Signal from the marrow spaces in the lumbar spine is unremarkable. The conus medullaris lies at the L1 level. No abnormal enhancement is seen  after contrast administration. At L4-5, there is mild diffuse posterior disc bulge without significant central  or foraminal narrowing.     At L5-S1, there is a small central disc protrusion which abuts but does not  reflect the proximal traversing S1 nerve roots. No significant central  foraminal narrowing. Impression IMPRESSION:  VERY MILD LOWER LUMBAR SPONDYLOSIS WITHOUT SIGNIFICANT IMPINGEMENT  UPON THE NEURAL ELEMENTS. Most recent CTA  Results from [place] encounter on 02/18/20   CT PELV W CONT    Narrative CT PELVIS WITH CONTRAST 2/18/2020    HISTORY: Right-sided pelvic pain status post hysterectomy 2 weeks ago. TECHNIQUE: The patient received oral contrast and 100 mL Isovue-370 nonionic IV  contrast. Axial images were obtained through the pelvis. Coronal reformatted  images were generated. All CT scans at this facility used dose modulation,  interactive reconstruction and/or weight based dosing when appropriate to reduce  radiation dose to as low as reasonably achievable. COMPARISON: None    FINDINGS: Enteric contrast is present within the colon in nondilated distal  small bowel loops. The appendix is normal in appearance. A small amount of free  pelvic fluid is present and is not unexpected in the postoperative setting. There is no peripherally enhancing abscess or large hematoma. The bladder is  normal in appearance. There are no aggressive osseous lesions. Lucent areas are  present beneath the weightbearing surface of the right femoral head. This may be  secondary to avascular necrosis. Impression IMPRESSION:    1. Trace amount of free pelvic fluid status post hysterectomy. 2. Possible AVN of right hip. Most recent Echo  No results found for this visit on 02/18/20. Most recent lipid panels  No results found for: CHOL, CHOLPOCT, CHOLX, CHLST, CHOLV, HDL, HDLPOC, HDLP, LDL, LDLCPOC, LDLC, DLDLP, VLDLC, VLDL, TGLX, TRIGL, TRIGP, TGLPOCT, CHHD, CHHDX    Most recent Hgb A1C  No results found for: HBA1C, HGBE8, CUJ4VOBB, DYS2VLYP              Diagnoses and all orders for this visit:    1. Postoperative pain    2.  Urinary tract infection without hematuria, site unspecified    Other orders  -     ABD PAIN BELOW PANEL TRACKING; Standing  -     CBC W/O DIFF; Standing  -     URINALYSIS W/ RFLX MICROSCOPIC; Standing  -     sodium chloride 0.9 % bolus infusion 500 mL  -     promethazine (PHENERGAN) with saline injection 12.5 mg  -     HYDROmorphone (PF) (DILAUDID) injection 0.5 mg  -     METABOLIC PANEL, COMPREHENSIVE; Standing  -     ketorolac (TORADOL) injection 30 mg  -     cefTRIAXone (ROCEPHIN) 1 g in 0.9% sodium chloride (MBP/ADV) 50 mL  -     sodium chloride 0.9 % bolus infusion 1,000 mL  -     loratadine (CLARITIN) tablet 10 mg  -     clonazePAM (KlonoPIN) tablet 1 mg  -     DULoxetine (CYMBALTA) capsule 60 mg  -     famotidine (PEPCID) tablet 20 mg  -     pantoprazole (PROTONIX) tablet 40 mg  -     magnesium oxide (MAG-OX) tablet 400 mg  -     naloxegol (MOVANTIK) tablet 25 mg (Patient Supplied)  -     polyethylene glycol (MIRALAX) packet 17 g  -     pregabalin (LYRICA) capsule 150 mg  -     VITAL SIGNS PER UNIT ROUTINE; Standing  -     UP AD RIGO; Standing  -     NOTIFY PROVIDER: VITAL SIGNS CHANGES; Standing  -     DIET REGULAR; Standing  -     lactated Ringers infusion  -     sodium chloride (NS) flush 5-40 mL  -     sodium chloride (NS) flush 5-40 mL  -     oxyCODONE IR (ROXICODONE) tablet 5-10 mg  -     naloxone (NARCAN) injection 0.4 mg  -     ondansetron (ZOFRAN) injection 4 mg  -     diphenhydrAMINE (BENADRYL) capsule 25 mg  -     docusate sodium (COLACE) capsule 100 mg  -     NO VTE PROPHYLAXIS NEEDED; Standing  -     CBC WITH AUTOMATED DIFF; Standing  -     ketorolac (TORADOL) injection 30 mg  -     INITIAL PHYSICIAN ORDER: OBSERVATION/OUTPATIENT IN A BED; Standing  -     cefTRIAXone (ROCEPHIN) 1 g in 0.9% sodium chloride (MBP/ADV) 50 mL  -     topiramate ER (TROKENDI XR) capsule 100 mg (Patient Supplied)  -     topiramate ER (TROKENDI XR) capsule 25 mg (Patient Supplied)  -     IP CONSULT TO ORTHOPEDIC SURGERY; Standing  -     acetaminophen (TYLENOL) tablet 1,000 mg  -     ketorolac (TORADOL) tablet 10 mg  -     MRI LUMB SPINE W WO CONT; Standing  -     IP CONSULT TO NEUROLOGY; Standing  -     gadoterate meglumine (DOTAREM) 0.5 mmol/mL (376.9 mg/mL) contrast solution 18 mL  -     saline peripheral flush soln 10 mL  -     calcium carbonate (TUMS) chewable tablet 200 mg [elemental]  -     temazepam (RESTORIL) capsule 15 mg      Assessment  Tele-neurology is not ideal for the management of headache. The patient has an upcoming appointment March 10 with Dr. Dotty Santos in the office. She is scheduled for March 10  This is the suitable environment in terms of complex  headache management. As noted it is really impossible to assess this type of situation via telemedicine, and the modern agents in terms of migraine or other headache management are not available on the hospital pharmacy  IV Depacon is useful and 500 mg every 8 hours may be of help. It should be noted however THAT HEADACHE CONTROL CAN NEVER BE ACHIEVED IN THE PRESENCE OF ACTIVE OPIOID USE.             Claus Frye MD

## 2020-02-20 NOTE — PROGRESS NOTES
Patient in bed with spouse present at bedside. Patient complaint of chronic headache, schedule tylenol and home medication given. Lungs sounds clear. Respirations even and unlabored. Abdomen soft and tender. Puncture site clean, dry, and intact. No distress observed. Side rails up x 2. Bed in low position. Call light in reach.

## 2020-02-20 NOTE — PROGRESS NOTES
Discharge instructions reviewed with pt/spouse . Called ortho office and left message to PEGGY Jiang who saw pt yesterday; they would like to follow up with him regarding their ortho concerns. PIV removed. No changes to medication. Instructed to finish the bactrim they have at home.

## 2020-02-20 NOTE — PROGRESS NOTES
Progress note     Yahir Potter  [id number]      S: Today c/o migraine. Has a h/o migraines, takes imitrex 100mg. Voiding, tolerating regular diet. No vaginal bleeding. Ambulating to BR. Waiting on TeleNeuro consult. O: Blood pressure 124/89, pulse 72, temperature 97.4 °F (36.3 °C), resp. rate 16, height 5' 4\" (1.626 m), weight 192 lb (87.1 kg), last menstrual period 01/08/2020, SpO2 98 %. No acute distress  Abdomen soft, appropriately tender. Incisions clean, dry, and intact. Extremities without evidence of DVT  Lab Results   Component Value Date/Time    WBC 4.8 02/19/2020 04:54 AM    HGB 10.3 (L) 02/19/2020 04:54 AM    HCT 33.1 (L) 02/19/2020 04:54 AM    PLATELET 892 85/61/3336 04:54 AM      MRI of spine on chart, showed mild spondylolysis without nerve impingement. Assessment:  Admitted for post op pain. Save evidence of UTI on history and UA, all other eval has been WNL. Has had Rocephin x 2, can continue Bactrim at home. Doubt acute or delayed bowel injury, has bowel function, NL CT, and NL WBC count. Probably was a little dehydrated based on initial urine SG, but voiding larger amounts now. Other issues more chronic. Now with Migraine, imitrex ordered as per hx of taking imitrex at home. Seen by POA yesterday, they will see post op. Awaiting neuro consult. After that Will discharge home. Instructions reviewed. Prescriptions not needed, has meds for chronic pain, and has appt with pain management on Monday. .  Follow up in office in 1 weeks.     Leonel Pedro MD  February 20, 2020  10:06 AM

## 2020-02-20 NOTE — PROGRESS NOTES
Came by to see patient and discuss MRI results. Patient had left.    MRI lumbar spine unchanged from prior studies   Recommend keeping appointment with  [facility] for her lumbar spine  Follow up with POA total joint surgeon with regard to the AVN of the hip No complaints

## 2020-02-20 NOTE — PROGRESS NOTES
Pt estella diet and fluids, spouse at bedside. Pt asking for pain meds fairly frequently; this morning c/o migraine HA for which she takes imitrex for as needed.

## 2020-02-20 NOTE — PROGRESS NOTES
Problem: Falls - Risk of  Goal: *Absence of Falls  Description  Document Berrycarlos Ryne Fall Risk and appropriate interventions in the flowsheet.   Outcome: Progressing Towards Goal  Note: Fall Risk Interventions:  Mobility Interventions: Patient to call before getting OOB         Medication Interventions: Evaluate medications/consider consulting pharmacy, Patient to call before getting OOB, Teach patient to arise slowly    Elimination Interventions: Call light in reach, Patient to call for help with toileting needs              Problem: Patient Education: Go to Patient Education Activity  Goal: Patient/Family Education  Outcome: Progressing Towards Goal     Problem: Pain  Goal: *Control of Pain  Outcome: Progressing Towards Goal     Problem: Urinary Tract Infection - Adult  Goal: *Absence of infection signs and symptoms  Outcome: Progressing Towards Goal

## 2020-02-20 NOTE — PROGRESS NOTES
02/20/20 0344   Pain 1   Pain Scale 1 Numeric (0 - 10)   Pain Intensity 1 5   Pain Location 1 Head;Abdomen   Pain Orientation 1 Anterior   Pain Description 1 Aching;Constant; Throbbing   Pain Intervention(s) 1 Medication (see MAR)   Toradol 30 mg po given    0414: pain rated 2/10

## 2020-07-11 ENCOUNTER — HOSPITAL ENCOUNTER (OUTPATIENT)
Dept: MAMMOGRAPHY | Age: 30
Discharge: HOME | End: 2020-07-11
Attending: OBSTETRICS & GYNECOLOGY
Payer: COMMERCIAL

## 2020-07-11 DIAGNOSIS — Z15.09 BRCA1 GENE MUTATION POSITIVE: ICD-10-CM

## 2020-07-11 DIAGNOSIS — Z80.3 FAMILY HISTORY OF BREAST CANCER IN FEMALE: ICD-10-CM

## 2020-07-11 DIAGNOSIS — Z15.01 BRCA1 GENE MUTATION POSITIVE: ICD-10-CM

## 2020-07-11 PROCEDURE — 77063 BREAST TOMOSYNTHESIS BI: CPT

## 2020-10-26 ENCOUNTER — HOSPITAL ENCOUNTER (OUTPATIENT)
Dept: MRI IMAGING | Age: 30
Discharge: HOME | End: 2020-10-26
Attending: OBSTETRICS & GYNECOLOGY
Payer: COMMERCIAL

## 2020-10-26 DIAGNOSIS — Z15.01 BRCA GENE MUTATION POSITIVE IN FEMALE: ICD-10-CM

## 2020-10-26 DIAGNOSIS — Z15.09 BRCA GENE MUTATION POSITIVE IN FEMALE: ICD-10-CM

## 2020-10-26 DIAGNOSIS — Z15.02 BRCA GENE MUTATION POSITIVE IN FEMALE: ICD-10-CM

## 2020-10-26 PROCEDURE — 77049 MRI BREAST C-+ W/CAD BI: CPT

## 2020-10-26 PROCEDURE — 74011000258 HC RX REV CODE- 258: Performed by: OBSTETRICS & GYNECOLOGY

## 2020-10-26 PROCEDURE — 74011250636 HC RX REV CODE- 250/636: Performed by: OBSTETRICS & GYNECOLOGY

## 2020-10-26 PROCEDURE — A9575 INJ GADOTERATE MEGLUMI 0.1ML: HCPCS | Performed by: OBSTETRICS & GYNECOLOGY

## 2020-10-26 RX ORDER — GADOTERATE MEGLUMINE 376.9 MG/ML
20 INJECTION INTRAVENOUS
Status: COMPLETED | OUTPATIENT
Start: 2020-10-26 | End: 2020-10-26

## 2020-10-26 RX ORDER — SODIUM CHLORIDE 0.9 % (FLUSH) 0.9 %
10 SYRINGE (ML) INJECTION
Status: COMPLETED | OUTPATIENT
Start: 2020-10-26 | End: 2020-10-26

## 2020-10-26 RX ADMIN — Medication 10 ML: at 18:50

## 2020-10-26 RX ADMIN — GADOTERATE MEGLUMINE 20 ML: 376.9 INJECTION INTRAVENOUS at 18:50

## 2020-10-26 RX ADMIN — SODIUM CHLORIDE 100 ML: 900 INJECTION, SOLUTION INTRAVENOUS at 18:50

## 2021-02-02 NOTE — PROGRESS NOTES
Did not want to be waken up for vital signs if had fallen asleep. Patient in asleep in bed at this time Will check vial signs when awakens. No distress observed.  Spouse at bedside Vitaliy Mackey,  02/03/21

## 2022-03-18 PROBLEM — E66.01 SEVERE OBESITY (HCC): Status: ACTIVE | Noted: 2019-08-21

## 2022-03-18 PROBLEM — R30.0 DYSURIA: Status: ACTIVE | Noted: 2020-02-18

## 2022-03-19 PROBLEM — F48.9 PSYCHOGENIC DISORDER: Status: ACTIVE | Noted: 2020-02-06

## 2022-03-19 PROBLEM — Z80.3 FAMILY HISTORY OF BREAST CANCER IN FEMALE: Status: ACTIVE | Noted: 2019-10-15

## 2022-03-19 PROBLEM — Z15.01 BRCA1 GENE MUTATION POSITIVE: Status: ACTIVE | Noted: 2019-10-15

## 2022-03-19 PROBLEM — R10.2 PELVIC PAIN: Status: ACTIVE | Noted: 2020-02-18

## 2022-03-19 PROBLEM — G93.41 ACUTE METABOLIC ENCEPHALOPATHY: Status: ACTIVE | Noted: 2020-02-07

## 2022-03-19 PROBLEM — Z15.09 BRCA1 GENE MUTATION POSITIVE: Status: ACTIVE | Noted: 2019-10-15

## 2022-03-20 PROBLEM — Z15.01 BRCA1 POSITIVE: Status: ACTIVE | Noted: 2020-02-05

## 2022-03-20 PROBLEM — Z15.09 BRCA1 POSITIVE: Status: ACTIVE | Noted: 2020-02-05

## 2022-07-19 ENCOUNTER — OFFICE VISIT (OUTPATIENT)
Dept: NEUROLOGY | Age: 32
End: 2022-07-19
Payer: MEDICARE

## 2022-07-19 ENCOUNTER — TELEPHONE (OUTPATIENT)
Dept: NEUROLOGY | Age: 32
End: 2022-07-19

## 2022-07-19 VITALS
OXYGEN SATURATION: 98 % | SYSTOLIC BLOOD PRESSURE: 125 MMHG | BODY MASS INDEX: 36.7 KG/M2 | WEIGHT: 215 LBS | HEART RATE: 89 BPM | HEIGHT: 64 IN | DIASTOLIC BLOOD PRESSURE: 90 MMHG

## 2022-07-19 DIAGNOSIS — F44.7 FUNCTIONAL NEUROLOGICAL SYMPTOM DISORDER WITH MIXED SYMPTOMS: Primary | ICD-10-CM

## 2022-07-19 DIAGNOSIS — R29.818 SUSPECTED SLEEP APNEA: ICD-10-CM

## 2022-07-19 DIAGNOSIS — G93.5 CHIARI I MALFORMATION (HCC): ICD-10-CM

## 2022-07-19 PROBLEM — M54.2 NECK PAIN: Status: ACTIVE | Noted: 2018-06-05

## 2022-07-19 PROBLEM — G83.21 PARALYSIS OF RIGHT UPPER EXTREMITY (HCC): Status: ACTIVE | Noted: 2018-03-27

## 2022-07-19 PROBLEM — R60.0 LOCALIZED EDEMA: Status: ACTIVE | Noted: 2020-06-23

## 2022-07-19 PROBLEM — K59.01 SLOW TRANSIT CONSTIPATION: Status: ACTIVE | Noted: 2021-11-09

## 2022-07-19 PROBLEM — Z15.01 GENETIC SUSCEPTIBILITY TO MALIGNANT NEOPLASM OF BREAST: Status: ACTIVE | Noted: 2019-10-02

## 2022-07-19 PROBLEM — M62.838 MUSCLE SPASM: Status: ACTIVE | Noted: 2019-08-05

## 2022-07-19 PROBLEM — K64.8 OTHER HEMORRHOIDS: Status: ACTIVE | Noted: 2018-03-27

## 2022-07-19 PROBLEM — R26.89 IMPAIRED GAIT AND MOBILITY: Status: ACTIVE | Noted: 2019-05-31

## 2022-07-19 PROBLEM — R40.0 SOMNOLENCE: Status: ACTIVE | Noted: 2018-07-03

## 2022-07-19 PROBLEM — G47.09 OTHER INSOMNIA: Status: ACTIVE | Noted: 2020-02-04

## 2022-07-19 PROBLEM — G43.009 MIGRAINE WITHOUT AURA AND WITHOUT STATUS MIGRAINOSUS, NOT INTRACTABLE: Status: ACTIVE | Noted: 2019-11-12

## 2022-07-19 PROBLEM — M54.41 CHRONIC MIDLINE LOW BACK PAIN WITH RIGHT-SIDED SCIATICA: Status: ACTIVE | Noted: 2019-11-12

## 2022-07-19 PROBLEM — F32.2 CURRENT SEVERE EPISODE OF MAJOR DEPRESSIVE DISORDER WITHOUT PSYCHOTIC FEATURES WITHOUT PRIOR EPISODE (HCC): Status: ACTIVE | Noted: 2018-04-16

## 2022-07-19 PROBLEM — M25.561 CHRONIC PAIN OF RIGHT KNEE: Status: ACTIVE | Noted: 2019-03-25

## 2022-07-19 PROBLEM — J30.1 SEASONAL ALLERGIC RHINITIS DUE TO POLLEN: Status: ACTIVE | Noted: 2019-10-03

## 2022-07-19 PROBLEM — R41.82 ALTERED MENTAL STATE: Status: ACTIVE | Noted: 2018-11-20

## 2022-07-19 PROBLEM — K21.9 GASTROESOPHAGEAL REFLUX DISEASE WITHOUT ESOPHAGITIS: Status: ACTIVE | Noted: 2019-03-13

## 2022-07-19 PROBLEM — H69.83 DYSFUNCTION OF BOTH EUSTACHIAN TUBES: Status: ACTIVE | Noted: 2019-10-03

## 2022-07-19 PROBLEM — M62.81 MUSCLE WEAKNESS OF RIGHT UPPER EXTREMITY: Status: ACTIVE | Noted: 2018-02-01

## 2022-07-19 PROBLEM — G89.29 CHRONIC PAIN OF RIGHT KNEE: Status: ACTIVE | Noted: 2019-03-25

## 2022-07-19 PROBLEM — M87.051 AVASCULAR NECROSIS OF BONE OF RIGHT HIP (HCC): Status: ACTIVE | Noted: 2020-04-22

## 2022-07-19 PROBLEM — M25.561 ARTHRALGIA OF RIGHT LOWER LEG: Status: ACTIVE | Noted: 2020-06-23

## 2022-07-19 PROBLEM — B35.1 ONYCHOMYCOSIS OF RIGHT GREAT TOE: Status: ACTIVE | Noted: 2019-06-04

## 2022-07-19 PROBLEM — R11.0 NAUSEA: Status: ACTIVE | Noted: 2019-04-04

## 2022-07-19 PROBLEM — H69.93 DYSFUNCTION OF BOTH EUSTACHIAN TUBES: Status: ACTIVE | Noted: 2019-10-03

## 2022-07-19 PROBLEM — Z96.641 STATUS POST TOTAL HIP REPLACEMENT, RIGHT: Status: ACTIVE | Noted: 2021-06-14

## 2022-07-19 PROBLEM — H66.009 NON-RECURRENT ACUTE SUPPURATIVE OTITIS MEDIA WITHOUT SPONTANEOUS RUPTURE OF TYMPANIC MEMBRANE: Status: ACTIVE | Noted: 2020-08-04

## 2022-07-19 PROBLEM — R32 URINARY INCONTINENCE: Status: ACTIVE | Noted: 2020-10-30

## 2022-07-19 PROBLEM — G89.29 CHRONIC MIDLINE LOW BACK PAIN WITH RIGHT-SIDED SCIATICA: Status: ACTIVE | Noted: 2019-11-12

## 2022-07-19 PROBLEM — U07.1 COVID-19: Status: ACTIVE | Noted: 2022-02-03

## 2022-07-19 PROBLEM — E87.6 HYPOKALEMIA: Status: ACTIVE | Noted: 2021-06-03

## 2022-07-19 PROBLEM — F41.1 ANXIETY STATE: Status: ACTIVE | Noted: 2018-04-09

## 2022-07-19 PROBLEM — R56.9 SEIZURES (HCC): Status: ACTIVE | Noted: 2018-11-26

## 2022-07-19 PROCEDURE — G8427 DOCREV CUR MEDS BY ELIG CLIN: HCPCS | Performed by: NURSE PRACTITIONER

## 2022-07-19 PROCEDURE — 1036F TOBACCO NON-USER: CPT | Performed by: NURSE PRACTITIONER

## 2022-07-19 PROCEDURE — 99214 OFFICE O/P EST MOD 30 MIN: CPT | Performed by: NURSE PRACTITIONER

## 2022-07-19 PROCEDURE — G8417 CALC BMI ABV UP PARAM F/U: HCPCS | Performed by: NURSE PRACTITIONER

## 2022-07-19 RX ORDER — ESZOPICLONE 3 MG/1
TABLET, FILM COATED ORAL
COMMUNITY
Start: 2022-06-22

## 2022-07-19 RX ORDER — CLONAZEPAM 1 MG/1
1 TABLET ORAL 2 TIMES DAILY
COMMUNITY

## 2022-07-19 RX ORDER — ATOGEPANT 30 MG/1
TABLET ORAL
COMMUNITY

## 2022-07-19 RX ORDER — LUBIPROSTONE 24 UG/1
CAPSULE, GELATIN COATED ORAL
COMMUNITY
Start: 2022-07-16

## 2022-07-19 RX ORDER — ESTRADIOL 0.05 MG/D
FILM, EXTENDED RELEASE TRANSDERMAL
COMMUNITY
Start: 2022-05-08

## 2022-07-19 ASSESSMENT — PATIENT HEALTH QUESTIONNAIRE - PHQ9
SUM OF ALL RESPONSES TO PHQ9 QUESTIONS 1 & 2: 0
SUM OF ALL RESPONSES TO PHQ QUESTIONS 1-9: 0
SUM OF ALL RESPONSES TO PHQ QUESTIONS 1-9: 0
1. LITTLE INTEREST OR PLEASURE IN DOING THINGS: 0
SUM OF ALL RESPONSES TO PHQ QUESTIONS 1-9: 0
2. FEELING DOWN, DEPRESSED OR HOPELESS: 0
SUM OF ALL RESPONSES TO PHQ QUESTIONS 1-9: 0
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
## 2022-07-19 NOTE — TELEPHONE ENCOUNTER
Patient seen today by Annette Elizalde for     Functional neurological symptom disorder with mixed symptoms     Requesting 1 year follow up. Patient aware.

## 2022-08-15 ENCOUNTER — HOSPITAL ENCOUNTER (OUTPATIENT)
Dept: SLEEP MEDICINE | Age: 32
Discharge: HOME | End: 2022-08-15
Payer: MEDICARE

## 2022-08-15 PROCEDURE — 95810 POLYSOM 6/> YRS 4/> PARAM: CPT

## 2022-08-21 ENCOUNTER — TELEPHONE (OUTPATIENT)
Dept: SLEEP MEDICINE | Age: 32
End: 2022-08-21

## 2022-08-21 NOTE — TELEPHONE ENCOUNTER
Patient needs New Pt PSG appt/ AHI -0       Lowest SaO2- 90%- eval for periodic limb movements         Forward to schedulers

## 2022-09-11 NOTE — PROGRESS NOTES
[address] [place] [address] Court, [address] [facility]  [phone]    Patient Name:  Diana Todd  YOB: 1990      Office Visit 9/13/2022    CHIEF COMPLAINT:    Chief Complaint   Patient presents with    New Patient    Sleep Apnea       HISTORY OF PRESENT ILLNESS:      The patient is a 42-year-old female who presents in outpatient consultation at the request of Oxana DEXTER for management of suspected sleep apnea due to history of snoring, insomnia, irritability, and excessive daytime sleepiness. Significant PMH of depression, PTSD, Chiari malformation 1, obesity, and GERD. Patient's  and service dog present in room during exam.  She reports she is tired all the time and often has to take naps during the day. Hensel score 12/24. She states that she is tired in the morning when she awakens in does not feel that she is well rested. She has a history of insomnia and takes Lunesta 3 mg nightly before bed. She reports she does not feel that this works as well as it used to and is taking her longer to fall asleep than previously.  reports that he has noticed that she snores occasionally and he feels like she is having some type of seizure activity when she is sleeping due to her shaking. She reports that she will awaken 1-2 times per night to use the restroom. She denies having any television in bedroom but  states that he does notice her using her iPhone and iPad sometimes as late as 12 to 1 am.  She denies having any noticeable leg movement during sleep at night.  does report that when she has her shaking episodes it is also her legs. She denies having any leg pain or numbness different than her normal.  States she has had her right hip replaced due to AVN says she has had numbness in her right leg for a while.   States she recently had an injection in her left hip for bursitis but but does not notice any numbness or pain in her left leg. She does take many medications that can lead to excessive drowsiness. She is on Klonopin 2 mg, 2 times daily, baclofen 10 mg daily, oxycodone 10 mg, 2 times daily, and Lyrica 150 mg, 2 times daily. She denies any excessive weight gain or weight loss over the last year. She denies any swelling in her ankles or feet. Her blood pressure is controlled today. The diagnostic polysomnography was notable for an apnea hypopnea index of 0 including 0 obstructive apneas and 0 hypopneas. Oxygen desaturations are low as 90% were noted with SpO2 less than 89% for a total of 0.0 minutes of the test.  Significant cardiac arrhythmias were not evident. The patient was noted to have 135.8 limb movements with a limb movement arousal index being about 0.0. Patient reports that she did not feel like the sleep study was accurate. States she did not feel like she slept well. Her sleep efficiency from the study was 71.3% and she had 2 REM cycles with 11.9% of the study being in REM.       Sleepiness Scale:     2  Sitting and Reading    2  Watching TV    1  Sitting inactive in a public place    3  As a passenger in a care for an hour without a      break    3  Lying down to rest in the afternoon when       circumstances permit    0  Sitting and talking to someone    1   Sitting quietly after lunch without alcohol    0  In a car, stopped for a few minutes in traffic    12     = Total    Sleep Study- [facility]- 8/15/22          Past Medical History:   Diagnosis Date    Anemia     no supplement     Anxiety     ASCUS with positive high risk HPV cervical 2014    Asthma     mild per pulmonary note; rescue inhaler     BMI 32.0-32.9,adult     BRCA1 positive 10/2019    Chiari I malformation ([facility] [address]     3 brain surgeries     Chronic pain     Constipation     Depression     GERD (gastroesophageal reflux disease)     on med for control     Heart murmur     \"I think I was born with it\"; last echo 10/29/18    Impaired mobility walks with braces, cane, and uses a wheelchair occasionally     Migraine     Multiple falls     Nausea & vomiting     post-op N/V; per patient Zofran does not help     Paralysis (HCC)     right upper and lower extremities     Postprocedural pseudomeningocele     PTSD (post-traumatic stress disorder)     Seizures ([address]     non epileptic per patient; per neuro note dated 01/02/19=pseudoseizures; last seizure 2 days ago; on topamax  (noted 01/29/2020)     Stroke ([address]     possible stroke during third brain surgery in 2018         Patient Active Problem List   Diagnosis    Dysuria    Severe obesity ([address]    Pelvic pain    Acute metabolic encephalopathy    Psychogenic disorder    Family history of breast cancer in female    BRCA1 gene mutation positive    ASCUS with positive high risk HPV cervical    BRCA1 positive    Chiari I malformation (HCC)    Allergic rhinitis    Altered mental state    Anxiety state    Arthralgia of right lower leg    Avascular necrosis of bone of right hip (HCC)    Chronic midline low back pain with right-sided sciatica    Chronic pain of right knee    Complicated migraine    [id number]    Current severe episode of major depressive disorder without psychotic features without prior episode ([address]    Depression    Dysfunction of both eustachian tubes    Exercise-induced asthma    Gastroesophageal reflux disease without esophagitis    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    Hypokalemia    Impaired gait and mobility    Localized edema    Migraine without aura and without status migrainosus, not intractable    Muscle weakness of right upper extremity    Muscle spasm    Nausea    Neck pain    Non-recurrent acute suppurative otitis media without spontaneous rupture of tympanic membrane    Onychomycosis of right great toe    Other hemorrhoids    Other insomnia    Paralysis of right upper extremity (HCC)    Postprocedural pseudomeningocele    Seasonal allergic rhinitis due to pollen Seizures (HCC)    Slow transit constipation    Somnolence    Status post total hip replacement, right    Urinary incontinence           Past Surgical History:   Procedure Laterality Date    HEENT      dental implants     HYSTERECTOMY (CERVIX STATUS UNKNOWN)      ORTHOPEDIC SURGERY Right     hip    ORTHOPEDIC SURGERY      left hand    OTHER SURGICAL HISTORY  6/10/16 & 7/04/16 & 1/17/18    Brain surgery    TONSILLECTOMY AND ADENOIDECTOMY           Social History     Socioeconomic History    Marital status:      Spouse name: Not on file    Number of children: Not on file    Years of education: Not on file    Highest education level: Not on file   Occupational History    Not on file   Tobacco Use    Smoking status: Never    Smokeless tobacco: Never   Substance and Sexual Activity    Alcohol use: Yes    Drug use: No    Sexual activity: Not on file   Other Topics Concern    Not on file   Social History Narrative    Not on file     Social Determinants of Health     Financial Resource Strain: Not on file   Food Insecurity: Not on file   Transportation Needs: Not on file   Physical Activity: Not on file   Stress: Not on file   Social Connections: Not on file   Intimate Partner Violence: Not on file   Housing Stability: Not on file         Family History   Problem Relation Age of Onset    Breast Cancer Maternal Grandmother     Heart Disease Maternal Grandmother     Heart Disease Maternal Grandfather     Diabetes Maternal Grandfather     Breast Cancer Paternal Grandmother         + BRCA    Ovarian Cancer Paternal Grandmother     Cancer Paternal Grandmother         pancreatic/liver    Ovarian Cancer Other         great aunt?     Breast Cancer Mother     Colon Cancer Neg Hx          Allergies   Allergen Reactions    Cephalexin Nausea And Vomiting     FATIGUE    Triptans     Erythromycin Rash    Penicillins Rash    Zolpidem Other (See Comments)     Sleep walking  sleepwalking         Current Outpatient Medications REVIEW OF SYSTEMS:   CONSTITUTIONAL:   There is no history of fever, chills, night sweats, weight loss, weight gain, persistent fatigue, or lethargy/hypersomnolence. EYES:   Denies problems with eye pain, erythema, blurred vision, or visual field loss. ENTM:   Denies history of tinnitus, epistaxis, sore throat, hoarseness, or dysphonia. LYMPH:   Denies swollen glands. CARDIAC:   No chest pain, pressure, discomfort, palpitations, orthopnea, murmurs, or edema. GI:   No dysphagia, heartburn reflux, nausea/vomiting, diarrhea, abdominal pain, or bleeding. :   Denies history of dysuria, hematuria, polyuria, or decreased urine output. MS:   No history of myalgias, arthralgias, bone pain, or muscle cramps. SKIN:   No history of rashes, jaundice, cyanosis, nodules, or ulcers. ENDO:   Negative for heat or cold intolerance. No history of DM. PSYCH:   Negative for anxiety, depression, insomnia, hallucinations. NEURO:   There is no history of AMS, persistent headache, decreased level of consciousness, seizures, or motor or sensory deficits. PHYSICAL EXAM:    Vitals:    09/13/22 1510   BP: 122/76   Pulse: 81   Resp: 13   Temp: 97.4 °F (36.3 °C)   SpO2: 96%   Weight: 215 lb (97.5 kg)   Height: 5' 4\" (1.626 m)        GENERAL APPEARANCE:   The patient is obese and in no respiratory distress. HEENT:   PERRL. Conjunctivae unremarkable. Nasal mucosa is without epistaxis, exudate, or polyps. Nares patent bilateral.  Gums and dentition are unremarkable. There is oropharyngeal narrowing. Douglass score 2. NECK/LYMPHATIC:   Symmetrical with no elevation of jugular venous pulsation. Trachea midline. No thyroid enlargement. No cervical adenopathy. LUNGS:   Normal respiratory effort with symmetrical lung expansion. Breath sounds clear. HEART:   There is a regular rate and rhythm. No murmur, rub, or gallop. There is no edema in the lower extremities. ABDOMEN:   Soft and non-tender. No hepatosplenomegaly. Bowel sounds are normal.     SKIN:   There are no rashes, cyanosis, jaundice, or ecchymosis present. EXTREMITIES:   The extremities are unremarkable without clubbing, cyanosis, joint inflammation, degenerative, or ischemic change. MUSCULOSKELETAL:   There is no abnormal tone, muscle atrophy, or abnormal movement present. NEURO:   The patient is alert and oriented to person, place, and time. Memory appears intact and mood is normal.  No gross sensorimotor deficits are present. ASSESSMENT:  (Medical Decision Making)         ICD-10-CM    1. Suspected sleep apnea  R29.818 In lab sleep study negative for sleep apnea. Sleep efficiency of 71.3% and positive HPI for sleep apnea, may order HST after checking and potentially treating RLS. We will reevaluate at follow-up      2. Poor sleep hygiene  C83.970 Advised patient to try and reduce bluelight devices such as iPhone and iPad in bed. 3. Snoring  R06.83 Positional therapy to sleep in the side-lying position. 4. Hypersomnia  G47.10 Patient on many medications that may be contributing to hypersomnia. May order HST at  Follow-up depending upon work-up for RLS      5. Obesity (BMI 35.0-39.9 without comorbidity)  E66.9 Patient can lose weight including ambulating 8-10,000 steps. Can Build Up Slowly as tolerated to this goal.    Also modifying dietary intake with decrease carbohydrates and sweets. Told to use avoid the P's --> Pizza, Pasta, Pastry, etc.  Increase fruits, vegetables, and lean meats e.g. [place] [place] chicken or game meat. Patient is aware the goal is to consume less than 2000 sulma/day, since patient is a nondiabetic. We discussed using the Christian LOSE IT to count calories. Patient can police themselves. If the future, if still having issues can use a more regimented diet e.g. [place], [address] etc. Clinical correlation suggested. 6. RLS (restless legs syndrome)  G25.81 Ferritin.   May start trial of Requip after getting ferritin results. 7. Primary insomnia  F51.01 Continue Lunesta as prescribed by primary. Start back taking melatonin 10 mg 1 hour before bedtime. 8. Iron deficiency  E61.1 Ferritin              PLAN:    Check ferritin level  Recommendations as above  Follow up with the Sleep Center will be 3 months or sooner if needed          Orders Placed This Encounter   Procedures    Ferritin     Standing Status:   Future     Standing Expiration Date:   9/13/2023              Collaborating Physician: Dr. Christiano Kay     Over 50% of today's office visit was spent in face to face time reviewing test results, prognosis, importance of compliance, education about disease process, benefits of medications, instructions for management of acute flare-ups, and follow up plans. Total face to face time spent with patient was 40 minutes.     LEAH Mendoza CNP  Electronically signed

## 2022-09-13 ENCOUNTER — OFFICE VISIT (OUTPATIENT)
Dept: SLEEP MEDICINE | Age: 32
End: 2022-09-13
Payer: MEDICARE

## 2022-09-13 VITALS
DIASTOLIC BLOOD PRESSURE: 76 MMHG | RESPIRATION RATE: 13 BRPM | WEIGHT: 215 LBS | SYSTOLIC BLOOD PRESSURE: 122 MMHG | OXYGEN SATURATION: 96 % | HEIGHT: 64 IN | HEART RATE: 81 BPM | BODY MASS INDEX: 36.7 KG/M2 | TEMPERATURE: 97.4 F

## 2022-09-13 DIAGNOSIS — E66.9 OBESITY (BMI 35.0-39.9 WITHOUT COMORBIDITY): ICD-10-CM

## 2022-09-13 DIAGNOSIS — R29.818 SUSPECTED SLEEP APNEA: Primary | ICD-10-CM

## 2022-09-13 DIAGNOSIS — R06.83 SNORING: ICD-10-CM

## 2022-09-13 DIAGNOSIS — G47.10 HYPERSOMNIA: ICD-10-CM

## 2022-09-13 DIAGNOSIS — F51.01 PRIMARY INSOMNIA: ICD-10-CM

## 2022-09-13 DIAGNOSIS — G25.81 RLS (RESTLESS LEGS SYNDROME): ICD-10-CM

## 2022-09-13 DIAGNOSIS — E61.1 IRON DEFICIENCY: ICD-10-CM

## 2022-09-13 DIAGNOSIS — Z72.821 POOR SLEEP HYGIENE: ICD-10-CM

## 2022-09-13 PROCEDURE — 99203 OFFICE O/P NEW LOW 30 MIN: CPT | Performed by: NURSE PRACTITIONER

## 2022-09-13 RX ORDER — ESTRADIOL 0.75 MG/.75G
GEL TOPICAL
COMMUNITY
Start: 2022-09-07

## 2022-09-13 RX ORDER — VENLAFAXINE HYDROCHLORIDE 75 MG/1
CAPSULE, EXTENDED RELEASE ORAL
COMMUNITY
Start: 2022-07-26

## 2022-09-13 NOTE — PATIENT INSTRUCTIONS
Check ferritin level  Recommendations as above  Follow up with the Sleep Center will be 3 months or sooner if needed

## 2022-09-16 DIAGNOSIS — G25.81 RLS (RESTLESS LEGS SYNDROME): ICD-10-CM

## 2022-09-16 DIAGNOSIS — E61.1 IRON DEFICIENCY: ICD-10-CM

## 2022-09-16 LAB — FERRITIN SERPL-MCNC: 89 NG/ML (ref 8–388)

## 2022-09-20 ENCOUNTER — TELEPHONE (OUTPATIENT)
Dept: SLEEP MEDICINE | Age: 32
End: 2022-09-20

## 2022-09-20 NOTE — TELEPHONE ENCOUNTER
Called and left message with patient that her Ferritin level was normal at 89. Call us back with any questions.

## 2022-09-29 ENCOUNTER — OFFICE VISIT (OUTPATIENT)
Dept: NEUROLOGY | Age: 32
End: 2022-09-29
Payer: MEDICARE

## 2022-09-29 DIAGNOSIS — F44.5 PSEUDOSEIZURE: Primary | ICD-10-CM

## 2022-09-29 DIAGNOSIS — G81.90 HEMIPARESIS, UNSPECIFIED HEMIPARESIS ETIOLOGY, UNSPECIFIED LATERALITY (HCC): ICD-10-CM

## 2022-09-29 PROCEDURE — 99214 OFFICE O/P EST MOD 30 MIN: CPT | Performed by: PSYCHIATRY & NEUROLOGY

## 2022-09-29 PROCEDURE — 1036F TOBACCO NON-USER: CPT | Performed by: PSYCHIATRY & NEUROLOGY

## 2022-09-29 PROCEDURE — G8427 DOCREV CUR MEDS BY ELIG CLIN: HCPCS | Performed by: PSYCHIATRY & NEUROLOGY

## 2022-09-29 PROCEDURE — G8417 CALC BMI ABV UP PARAM F/U: HCPCS | Performed by: PSYCHIATRY & NEUROLOGY

## 2022-09-29 RX ORDER — LACOSAMIDE 50 MG/1
50 TABLET ORAL 2 TIMES DAILY
Qty: 60 TABLET | Refills: 5 | Status: SHIPPED | OUTPATIENT
Start: 2022-09-29 | End: 2022-10-29

## 2022-09-29 NOTE — PROGRESS NOTES
[address], [address], [address]  Phone: [phone] Fax [phone]  Dr. Charles Bedolla      9/29/2022  [address]     Patient is referred by the following provider for consultation regarding as below:       I reviewed the available records and notes and have examined patient with the following findings:     Chief Complaint:  Chief Complaint   Patient presents with    Established New Doctor    Neurologic Problem          HPI: This is a right handed 27 y.o.  female that unfortunately has a very awful history. In 2016 she was suffered from Chiari I malformation and had surgery. She had decompressive surgery postsurgery she has spinal fluid infection spinal fluid leak she had to go through a second surgery they put her on antibiotics for extended period time. She was on extended antibiotics for 6 weeks. Went back to [facility] as neurosurgery they suggested doing exploratory surgery which they had no interest in they went on to [facility] and saw Dr. Amy Dillon from the neurosurgery department as well as [place]. After repeated MRIs and CAT scans they were decided she needed another surgical procedure with a involving C2. She woke up in 2018 from that third decompressive surgical procedure and at this point she now was right-sided hemiparetic and from the elbow down in the right knee down was numb and tingling but but severe weakness to the point she needed OT and PT just to relearn how to walk she has a right AFO she has brisk reflexes. She has headaches and started developing these episodes that were seizure-like. An episode can occur in a month's time about 6-8 episodes. Post ictal bradycardia as well with jerking and twitching of muscles occur at night. And it was believed to be pseudoseizures they did go to the epilepsy center at [facility] and did video EEG monitoring but there was awful experience and should be just thrown out and recheck.   She was on Lamictal they immediately stopped Lamictal told her to see a psychiatrist after the 4 days of a 7-day video EEG monitoring. They were not thrilled with the experience at all. They then went down and saw Dr. Yamilet Meyers he evaluated felt that a lot of the movement disorder stuff was functional as well seizures were functional but is hard to tell. This is mostly based off of the video EEG that occurred at [facility]. They then went on to did a sleep study and there were 11 arousals seen throughout the night on EEG component this does not indicate seizure activity. It just indicated 11 arousals. The sleep study did not show any significant apnea. IMAGING REVIEW:  I REVIEWED PERTINENT  IMAGES AND REPORTS WITH THE PATIENT PERSONALLY, DIRECTLY AND FULLY.      Past Medical History:  Past Medical History:   Diagnosis Date    Anemia     no supplement     Anxiety     ASCUS with positive high risk HPV cervical 2014    Asthma     mild per pulmonary note; rescue inhaler     BMI 32.0-32.9,adult     BRCA1 positive 10/2019    Chiari I malformation ([address]     3 brain surgeries     Chronic pain     Constipation     Depression     GERD (gastroesophageal reflux disease)     on med for control     Heart murmur     \"I think I was born with it\"; last echo 10/29/18    Impaired mobility     walks with braces, cane, and uses a wheelchair occasionally     Migraine     Multiple falls     Nausea & vomiting     post-op N/V; per patient Zofran does not help     Paralysis ([address]     right upper and lower extremities     Postprocedural pseudomeningocele     PTSD (post-traumatic stress disorder)     Seizures ([address]     non epileptic per patient; per neuro note dated 01/02/19=pseudoseizures; last seizure 2 days ago; on topamax  (noted 01/29/2020)     Stroke [facility])     possible stroke during third brain surgery in 2018       Past Surgical History:  Past Surgical History:   Procedure Laterality Date    HEENT      dental implants HYSTERECTOMY (CERVIX STATUS UNKNOWN)      ORTHOPEDIC SURGERY Right     hip    ORTHOPEDIC SURGERY      left hand    OTHER SURGICAL HISTORY  6/10/16 & 7/04/16 & 1/17/18    Brain surgery    TONSILLECTOMY AND ADENOIDECTOMY         Social History:  Social History     Socioeconomic History    Marital status:      Spouse name: Not on file    Number of children: Not on file    Years of education: Not on file    Highest education level: Not on file   Occupational History    Not on file   Tobacco Use    Smoking status: Never    Smokeless tobacco: Never   Substance and Sexual Activity    Alcohol use: Yes    Drug use: No    Sexual activity: Not on file   Other Topics Concern    Not on file   Social History Narrative    Not on file     Social Determinants of Health     Financial Resource Strain: Not on file   Food Insecurity: Not on file   Transportation Needs: Not on file   Physical Activity: Not on file   Stress: Not on file   Social Connections: Not on file   Intimate Partner Violence: Not on file   Housing Stability: Not on file       Family History:   Family History   Problem Relation Age of Onset    Breast Cancer Maternal Grandmother     Heart Disease Maternal Grandmother     Heart Disease Maternal Grandfather     Diabetes Maternal Grandfather     Breast Cancer Paternal Grandmother         + BRCA    Ovarian Cancer Paternal Grandmother     Cancer Paternal Grandmother         pancreatic/liver    Ovarian Cancer Other         great aunt?     Breast Cancer Mother     Colon Cancer Neg Hx        Current Outpatient Medications on File Prior to Visit   Medication Sig Dispense Refill    DIVIGEL 0.75 MG/0.75GM GEL PLACE 0.75 MG ON THE SKIN DAILY      venlafaxine (EFFEXOR XR) 75 MG extended release capsule TAKE 1 CAPSULE BY MOUTH EVERY DAY      sodium chloride nebulizer 0.9 % NEBU 30 mL with albuterol (5 MG/ML) 0.5% NEBU Inhale 2 puffs into the lungs      Ascorbic Acid (VITAMIN C PO) Take 1 tablet by mouth daily      BIOTIN PO Physical Exam  Constitutional:       Appearance: Normal appearance. Comments: Cognitively that she is severely affected with memory loss she is she is outgoing and able to communicate but most of the history she gives is nonspecific and not geared towards her problems. HENT:      Head: Normocephalic and atraumatic. Eyes:      Extraocular Movements: EOM normal.   Cardiovascular:      Rate and Rhythm: Normal rate and regular rhythm. Pulses: Normal pulses. Pulmonary:      Effort: Pulmonary effort is normal.   Abdominal:      Palpations: Abdomen is soft. Neurological:      Mental Status: She is alert. Deep Tendon Reflexes:      Reflex Scores:       Tricep reflexes are 2+ on the right side and 2+ on the left side. Bicep reflexes are 2+ on the right side and 2+ on the left side. Brachioradialis reflexes are 2+ on the right side and 2+ on the left side. Patellar reflexes are 2+ on the right side and 2+ on the left side. Achilles reflexes are 2+ on the right side and 2+ on the left side. Neurologic Exam     Mental Status   Attention: decreased. Concentration: decreased. Level of consciousness: alert  Knowledge: good and poor. Cranial Nerves     CN II   Visual fields full to confrontation. CN III, IV, VI   Extraocular motions are normal.     CN VII   Facial expression full, symmetric. Motor Exam Her exam seems a little can inconsistent with her abilities. Her right biceps 2-/5 right deltoids 2-/5 she asked if she could barely move it or lift it but then you when using a rolling walker it seems to work quite well. Not quite well but definitely a little better than during the exam.  She has brisk reflexes bilaterally the right side is a little higher than the left. She has a right AFO on she cannot she can raise her leg against gravity barely. So would be 2+/5. She uses a rolling walker to ambulate.      Gait, Coordination, and Reflexes     Gait  Gait: circumduction and wide-based    Tremor   Resting tremor: absent  Intention tremor: absent  Action tremor: absent    Reflexes   Right brachioradialis: 2+  Left brachioradialis: 2+  Right biceps: 2+  Left biceps: 2+  Right triceps: 2+  Left triceps: 2+  Right patellar: 2+  Left patellar: 2+  Right achilles: 2+  Left achilles: 2+        Assessment   Assessment / Plan: David Holt was seen today for established new doctor and neurologic problem. Diagnoses and all orders for this visit:    Pseudoseizure this was based off of a video EEG monitoring done at [facility] but apparently it was an absolute horrific experience they were supposed to stay awake within 3 to 4 days he got in an argument with the doctor and that doctor abruptly ended it stating that he had psychogenic and sent her to psychiatry and discontinued her Lamictal.  Of which the Lamictal really was not working. She has these recurrent episodes may be average may be 6 a month. She can go couple weeks or week without any episodes and then have a couple episodes in the week. This all started after the Chiari issues. At this point organ to move forward with an epilepsy evaluation at epilepsy center. We will begin Vimpat 50 mg twice a day. And see what response we will get. She is only been on Lamictal for in the past.    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 At this point there is very little we can do with the residual from 2016. The Diagnosis and differential diagnostic considerations, and Rx Tx were reviewed with the patient at length. No orders of the defined types were placed in this encounter.          I have spent greater than 50% of visit discussing and counseling of patient  for treatment and diagnostic plan review. Total time30 min     . Notes: Patient is to continue all medications as directed by prescribing physicians. Continuations on today's visit are made based on the patient's report of current medications.              Dr. Yuan Fail  Consultation Neurology, Neurodiagnostics and Neurotherapeutics  Neuroelectrophysiology, EEG, EMG  [facility]  [address] [place] [address], [address] [facility]  Phone:  [phone]  Fax:   [phone]

## 2023-01-24 ENCOUNTER — CLINICAL DOCUMENTATION (OUTPATIENT)
Dept: NEUROLOGY | Age: 33
End: 2023-01-24

## 2023-01-24 ENCOUNTER — TELEPHONE (OUTPATIENT)
Dept: NEUROLOGY | Age: 33
End: 2023-01-24

## 2023-01-24 DIAGNOSIS — F44.5 PSEUDOSEIZURE: Primary | ICD-10-CM

## 2023-01-24 NOTE — PROGRESS NOTES
I spoke with the patient she does understand that she has pseudoseizures and I strongly recommended cognitive behavioral therapy so we will attempt to set this up for the patient. She also needs a psychiatrist she wants a female psychiatrist which I did offer her through [employer]. The patient has an enormous problems with [employer] billing department and so wanted to go somewhere else I explained to her that I will be more than welcome to refer to any psychiatrist she would like but she is going to have to call her insurance company and find out who is on her plan that she would like me to refer her to and she stated she would look into it. She would like her headaches addressed. Which I will be more than welcome to address. She has an appointment in March which is not far off. We will attempt to get her in a little bit earlier.

## 2023-01-24 NOTE — TELEPHONE ENCOUNTER
I spoke with the patient in detail about her cognitive behavioral eval referral to [facility]. After speaking with [facility] they will not accept the referral because the patient has not had a TBI or stroke. The patient stated she would reach out to [address] for further instructions or find another location to send the referral. She will get back with me soon.

## 2023-03-09 ENCOUNTER — OFFICE VISIT (OUTPATIENT)
Dept: NEUROLOGY | Age: 33
End: 2023-03-09
Payer: MEDICARE

## 2023-03-09 VITALS — SYSTOLIC BLOOD PRESSURE: 110 MMHG | HEIGHT: 64 IN | BODY MASS INDEX: 36.9 KG/M2 | DIASTOLIC BLOOD PRESSURE: 74 MMHG

## 2023-03-09 DIAGNOSIS — G25.9 FUNCTIONAL MOVEMENT DISORDER: ICD-10-CM

## 2023-03-09 DIAGNOSIS — G44.229 CHRONIC TENSION-TYPE HEADACHE, NOT INTRACTABLE: Primary | ICD-10-CM

## 2023-03-09 DIAGNOSIS — F44.5 PSEUDOSEIZURE: ICD-10-CM

## 2023-03-09 PROCEDURE — G8427 DOCREV CUR MEDS BY ELIG CLIN: HCPCS | Performed by: PSYCHIATRY & NEUROLOGY

## 2023-03-09 PROCEDURE — 99214 OFFICE O/P EST MOD 30 MIN: CPT | Performed by: PSYCHIATRY & NEUROLOGY

## 2023-03-09 PROCEDURE — G8417 CALC BMI ABV UP PARAM F/U: HCPCS | Performed by: PSYCHIATRY & NEUROLOGY

## 2023-03-09 PROCEDURE — 1036F TOBACCO NON-USER: CPT | Performed by: PSYCHIATRY & NEUROLOGY

## 2023-03-09 PROCEDURE — G8484 FLU IMMUNIZE NO ADMIN: HCPCS | Performed by: PSYCHIATRY & NEUROLOGY

## 2023-03-09 RX ORDER — DOCUSATE SODIUM 100 MG/1
100 CAPSULE, LIQUID FILLED ORAL 2 TIMES DAILY
COMMUNITY

## 2023-03-09 RX ORDER — CLONAZEPAM 2 MG/1
2 TABLET ORAL 2 TIMES DAILY PRN
COMMUNITY

## 2023-03-09 RX ORDER — OXYCODONE HYDROCHLORIDE 5 MG/1
5 TABLET ORAL 2 TIMES DAILY
COMMUNITY

## 2023-03-09 ASSESSMENT — ENCOUNTER SYMPTOMS
RESPIRATORY NEGATIVE: 1
GASTROINTESTINAL NEGATIVE: 1
EYES NEGATIVE: 1

## 2023-03-09 NOTE — PROGRESS NOTES
[address], [address] [place] [address], [address] [place] [address]  Phone: [phone] Fax [phone]  Dr. Philippe Fam      3/9/2023  [address]     Patient is referred by the following provider for consultation regarding as below:       I reviewed the available records and notes and have examined patient with the following findings:     Chief Complaint:  No chief complaint on file. HPI: This is a right handed 27 y.o.  female here with her . And they are both very aware that we do not treat pseudoseizures and we do not treat functional movement disorders. She has been diagnosed with both of those by [facility] and over at [facility]. She apparently has an appointment over [facility] for some of the pseudoseizures and I am not sure if this is cognitive behavioral therapy or what they are actually going to do. And they were little bit not clear as to what would happen either. The patient started having headaches many years ago and went through Chiari I surgery in 2016 where she had decompressive surgery. She had a second surgery immediately after due to infection. 6 weeks of antibiotics. Then she in 2018 she needed another cervical surgery at [facility] for third decompression. And she states she has had a right hemiparesis ever since then even though she has a left facial droop and. Its in the hemiparesis is from her right elbow down in her right knee down. And she is a right AFO. In the past she has been diagnosed with functional movement disorders as well as pseudoseizures by both [facility] and I believe [facility]. The patient's headaches are what we are treating and only the headaches. Her chronic pain management physician has been treating her headaches. She gets 1 a week or 2 a week or sometimes can last 1 day or less sometimes last multiple days they are really not capable of telling me the frequency.   They can be frontal as well as occipital photophobia phonophobia nausea and vomiting associated with it. She usually tries to find a dark quiet room. These headaches are different than her pre-Chiari 1 surgery headaches. Her chronic pain management is tried Aimovig and Ajovy, multiple samples of medications and I am can assume Ubrelvy and Nurtec, Topamax and true Trokendi or tried Imitrex and amitriptyline. I would not use a beta-blocker on this patient due to depression issues. At this point I would move forward with Botox. She could not offer much of any information about her headaches. Her  offered majority of the information. IMAGING REVIEW:  I REVIEWED PERTINENT  IMAGES AND REPORTS WITH THE PATIENT PERSONALLY, DIRECTLY AND FULLY.      Past Medical History:  Past Medical History:   Diagnosis Date    Anemia     no supplement     Anxiety     ASCUS with positive high risk HPV cervical 2014    Asthma     mild per pulmonary note; rescue inhaler     BMI 32.0-32.9,adult     BRCA1 positive 10/2019    Chiari I malformation ([address]     3 brain surgeries     Chronic pain     Constipation     Depression     GERD (gastroesophageal reflux disease)     on med for control     Heart murmur     \"I think I was born with it\"; last echo 10/29/18    Impaired mobility     walks with braces, cane, and uses a wheelchair occasionally     Migraine     Multiple falls     Nausea & vomiting     post-op N/V; per patient Zofran does not help     Paralysis ([address]     right upper and lower extremities     Postprocedural pseudomeningocele     PTSD (post-traumatic stress disorder)     Seizures ([address]     non epileptic per patient; per neuro note dated 01/02/19=pseudoseizures; last seizure 2 days ago; on topamax  (noted 01/29/2020)     Stroke ([address]     possible stroke during third brain surgery in 2018       Past Surgical History:  Past Surgical History:   Procedure Laterality Date    HEENT      dental implants     HYSTERECTOMY (CERVIX STATUS UNKNOWN)      ORTHOPEDIC SURGERY Right     hip ORTHOPEDIC SURGERY      left hand    OTHER SURGICAL HISTORY  6/10/16 & 7/04/16 & 1/17/18    Brain surgery    TONSILLECTOMY AND ADENOIDECTOMY         Social History:  Social History     Socioeconomic History    Marital status:      Spouse name: Not on file    Number of children: Not on file    Years of education: Not on file    Highest education level: Not on file   Occupational History    Not on file   Tobacco Use    Smoking status: Never    Smokeless tobacco: Never   Substance and Sexual Activity    Alcohol use: Yes    Drug use: No    Sexual activity: Not on file   Other Topics Concern    Not on file   Social History Narrative    Not on file     Social Determinants of Health     Financial Resource Strain: Not on file   Food Insecurity: Not on file   Transportation Needs: Not on file   Physical Activity: Not on file   Stress: Not on file   Social Connections: Not on file   Intimate Partner Violence: Not on file   Housing Stability: Not on file       Family History:   Family History   Problem Relation Age of Onset    Breast Cancer Maternal Grandmother     Heart Disease Maternal Grandmother     Heart Disease Maternal Grandfather     Diabetes Maternal Grandfather     Breast Cancer Paternal Grandmother         + BRCA    Ovarian Cancer Paternal Grandmother     Cancer Paternal Grandmother         pancreatic/liver    Ovarian Cancer Other         great aunt? Breast Cancer Mother     Colon Cancer Neg Hx        Current Outpatient Medications on File Prior to Visit   Medication Sig Dispense Refill    clonazePAM (KLONOPIN) 2 MG tablet Take 2 mg by mouth 2 times daily as needed. oxyCODONE (ROXICODONE) 5 MG immediate release tablet Take 5 mg by mouth in the morning and at bedtime.       docusate sodium (COLACE) 100 MG capsule Take 100 mg by mouth 2 times daily      venlafaxine (EFFEXOR XR) 75 MG extended release capsule TAKE 1 CAPSULE BY MOUTH EVERY DAY      sodium chloride nebulizer 0.9 % NEBU 30 mL with albuterol (5 MG/ML) 0.5% NEBU Inhale 2 puffs into the lungs      Ascorbic Acid (VITAMIN C PO) Take 1 tablet by mouth daily      BIOTIN PO Take by mouth daily      Magnesium Gluconate (MAGNESIUM 27 PO) Take 250 mg by mouth 2 times daily      SPIRONOLACTONE PO Take 50 mg by mouth 2 times daily      MULTIPLE VITAMINS-IRON PO Take by mouth daily      eszopiclone (LUNESTA) 3 MG TABS TAKE 1 TABLET (3 MG) BY MOUTH AT BEDTIME AS NEEDED FOR INSOMNIA      lubiprostone (AMITIZA) 24 MCG capsule       estradiol (VIVELLE) 0.05 MG/24HR PLACE 1 PATCH ON THE SKIN 2 (TWO) TIMES A WEEK      Atogepant (QULIPTA) 30 MG TABS Take by mouth      baclofen (LIORESAL) 10 MG tablet Take 10 mg by mouth      Baclofen (LIORESAL) 5 MG tablet Take 5 mg by mouth 2 times daily      cetirizine (ZYRTEC) 10 MG tablet Take 10 mg by mouth      cyanocobalamin 1000 MCG tablet Take 1,000 mcg by mouth daily      polyethylene glycol (GLYCOLAX) 17 GM/SCOOP powder Take 17 g by mouth 2 times daily      pregabalin (LYRICA) 150 MG capsule Take 150 mg by mouth 2 times daily. SUMAtriptan (IMITREX) 100 MG tablet Take 100 mg by mouth once as needed      DIVIGEL 0.75 MG/0.75GM GEL PLACE 0.75 MG ON THE SKIN DAILY       No current facility-administered medications on file prior to visit. Allergies   Allergen Reactions    Cephalexin Nausea And Vomiting     FATIGUE    Triptans     Erythromycin Rash    Penicillins Rash    Zolpidem Other (See Comments)     Sleep walking  sleepwalking       Review of Systems:  Review of Systems   Constitutional: Negative. HENT: Negative. Eyes: Negative. Respiratory: Negative. Cardiovascular: Negative. Gastrointestinal: Negative. Endocrine: Negative. Genitourinary: Negative. Musculoskeletal: Negative. Neurological:  Positive for headaches. Nonepileptiform seizures, functional movement disorder. Hematological: Negative. Psychiatric/Behavioral: Negative. No flowsheet data found.   No flowsheet data found. Vitals:    03/09/23 1151   BP: 110/74   Height: 5' 4\" (1.626 m)        Physical Exam  Constitutional:       Appearance: Normal appearance. HENT:      Head: Normocephalic and atraumatic. Eyes:      Extraocular Movements: Extraocular movements intact. Pupils: Pupils are equal, round, and reactive to light. Cardiovascular:      Rate and Rhythm: Normal rate and regular rhythm. Pulses: Normal pulses. Pulmonary:      Effort: Pulmonary effort is normal.   Abdominal:      Palpations: Abdomen is soft. Neurological:      Mental Status: She is alert and oriented to person, place, and time. Neurologic Exam     Mental Status   Oriented to person, place, and time. Attention: decreased. Concentration: decreased. Level of consciousness: alert  Knowledge: poor. She offers almost no information almost all her information about her headaches were obtained from her . Cranial Nerves     CN III, IV, VI   Pupils are equal, round, and reactive to light. She has a left facial droop     Motor Exam   Right arm tone: decreased  Left arm tone: decreased  Right leg tone: decreased  Left leg tone: decreased    Gait, Coordination, and Reflexes She is usually a rolling walker. Assessment   Assessment / Plan:    Diagnoses and all orders for this visit:    Chronic tension-type headache, not intractable she has gone through chronic pain management she has tried a multitude of medications most of which were mentioned above but on many that were not mentioned. Clearly Heaven Irizarry is not working. At this point I would move forward with Botox. These headaches are definitely different than when the way they were prior to her current her Chiari decompression. Pseudoseizure the patient continues to have pseudoseizures and pseudo events couple yesterday 1 this morning. And she is got appointment over at [facility] somewhere over there to be evaluated and treated.   I do not know if is cognitive behavioral therapy or not.  But she definitely needs a psychiatrist.  She had called early in January wanting a female psychiatrist so we attempted to set that up.    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        The Diagnosis and differential diagnostic considerations, and Rx Tx were reviewed with the patient at length.           No orders of the defined types were placed in this encounter.         I have spent greater than 50% of visit discussing and counseling of patient  for treatment and diagnostic plan review. Total time30     .      Notes: Patient is to continue all medications as directed by prescribing physicians. Continuations on today's visit are made based on the patient's report of current medications.             Dr. Jesus Alberto Mancini  Consultation Neurology, Neurodiagnostics and Neurotherapeutics  Neuroelectrophysiology, EEG, EMG  [facility]  [address] [place] [address]  Phone:  [phone]  Fax:   [phone]

## 2023-03-13 ENCOUNTER — TELEPHONE (OUTPATIENT)
Dept: NEUROLOGY | Age: 33
End: 2023-03-13

## 2023-03-13 DIAGNOSIS — G44.229 CHRONIC TENSION-TYPE HEADACHE, NOT INTRACTABLE: Primary | ICD-10-CM

## 2023-03-17 ENCOUNTER — TELEPHONE (OUTPATIENT)
Dept: NEUROLOGY | Age: 33
End: 2023-03-17

## 2023-03-17 NOTE — TELEPHONE ENCOUNTER
States that Botox is being rejected. Insurance has Depakote listed as preferred drug. Needs information why Depakote may not work.

## 2023-03-21 NOTE — PERIOP NOTES
Dr. Wes Arellano, anesthesia, reviewed chart - OK to proceed. Azathioprine Counseling:  I discussed with the patient the risks of azathioprine including but not limited to myelosuppression, immunosuppression, hepatotoxicity, lymphoma, and infections.  The patient understands that monitoring is required including baseline LFTs, Creatinine, possible TPMP genotyping and weekly CBCs for the first month and then every 2 weeks thereafter.  The patient verbalized understanding of the proper use and possible adverse effects of azathioprine.  All of the patient's questions and concerns were addressed.

## 2023-11-20 ENCOUNTER — OFFICE VISIT (OUTPATIENT)
Dept: NEUROLOGY | Age: 33
End: 2023-11-20
Payer: MEDICARE

## 2023-11-20 DIAGNOSIS — G43.709 CHRONIC MIGRAINE WITHOUT AURA WITHOUT STATUS MIGRAINOSUS, NOT INTRACTABLE: Primary | ICD-10-CM

## 2023-11-20 DIAGNOSIS — G45.9 TIA (TRANSIENT ISCHEMIC ATTACK): ICD-10-CM

## 2023-11-20 PROCEDURE — G8427 DOCREV CUR MEDS BY ELIG CLIN: HCPCS | Performed by: PSYCHIATRY & NEUROLOGY

## 2023-11-20 PROCEDURE — G8484 FLU IMMUNIZE NO ADMIN: HCPCS | Performed by: PSYCHIATRY & NEUROLOGY

## 2023-11-20 PROCEDURE — 1036F TOBACCO NON-USER: CPT | Performed by: PSYCHIATRY & NEUROLOGY

## 2023-11-20 PROCEDURE — G8417 CALC BMI ABV UP PARAM F/U: HCPCS | Performed by: PSYCHIATRY & NEUROLOGY

## 2023-11-20 PROCEDURE — 99214 OFFICE O/P EST MOD 30 MIN: CPT | Performed by: PSYCHIATRY & NEUROLOGY

## 2023-11-20 ASSESSMENT — ENCOUNTER SYMPTOMS
ALLERGIC/IMMUNOLOGIC NEGATIVE: 1
RESPIRATORY NEGATIVE: 1
EYES NEGATIVE: 1
GASTROINTESTINAL NEGATIVE: 1

## 2023-11-20 NOTE — PROGRESS NOTES
migrainosus, not intractable this is actually when I was originally being treated treating her for. We try to get her Botox unfortunately she failed she is on [place] and she is not allowed any triptans. TIA (transient ischemic attack) the patient did go into [facility] with a \"TIA\". MRIs of the brain were absolutely normal other than status post Chiari decompression. It should be noted it did not show any strokes old or new. CTAs of the head and neck were normal.  She has my medical clearance to go through the breast reconstructive surgery. We are going to check a hypercoagulable state. The Diagnosis and differential diagnostic considerations, and Rx Tx were reviewed with the patient at length. No orders of the defined types were placed in this encounter. I have spent greater than 50% of visit discussing and counseling of patient  for treatment and diagnostic plan review. Total time30     . Notes: Patient is to continue all medications as directed by prescribing physicians. Continuations on today's visit are made based on the patient's report of current medications.              Dr. Montoya General  Consultation Neurology, Neurodiagnostics and Neurotherapeutics  Neuroelectrophysiology, EEG, EMG  [facility]  [address] [facility], [address]  [place], [address] [place] [address]  Phone: [phone] Fax [phone]

## 2024-02-07 ENCOUNTER — OFFICE VISIT (OUTPATIENT)
Dept: NEUROLOGY | Age: 34
End: 2024-02-07

## 2024-02-07 VITALS
HEIGHT: 64 IN | WEIGHT: 215 LBS | BODY MASS INDEX: 36.7 KG/M2 | SYSTOLIC BLOOD PRESSURE: 113 MMHG | DIASTOLIC BLOOD PRESSURE: 77 MMHG | HEART RATE: 112 BPM

## 2024-02-07 DIAGNOSIS — R56.9 SEIZURE-LIKE ACTIVITY (HCC): ICD-10-CM

## 2024-02-07 DIAGNOSIS — F44.5 CONVERSION DISORDER WITH SEIZURES OR CONVULSIONS: ICD-10-CM

## 2024-02-07 DIAGNOSIS — R11.0 NAUSEA: ICD-10-CM

## 2024-02-07 DIAGNOSIS — Z79.899 MEDICATION MANAGEMENT: ICD-10-CM

## 2024-02-07 DIAGNOSIS — G43.009 MIGRAINE WITHOUT AURA AND WITHOUT STATUS MIGRAINOSUS, NOT INTRACTABLE: Primary | ICD-10-CM

## 2024-02-07 NOTE — PROGRESS NOTES
headaches: moderate physical exercise, fixed meal times, maintain regular sleep cycle, avoid smoking  - Limit or avoid caffeinated beverages (sodas, tea, coffee, energy drinks)  - Keep a headache diary or use an ava to reveal triggers and track headache patterns.            Return in about 5 months (around 7/7/2024).    Jessica Nayak MD   General Neurology   [address], [address]  [place] [address]  Phone: [phone]    All medications and relevant precautions were fully reviewed with the patient. More than 50% of this visit was used to evaluate, educate and coordinate care for the patient for the above concerns. Total visit time: 50 minutes. Time includes pre- and post- face-to-face time, including record review of available pertinent images and reports. I have written all aspects of this note, parts of which were produced using speech recognition software, which may contain inadvertent errors in the produced words.

## 2024-02-08 RX ORDER — PROMETHAZINE HYDROCHLORIDE 25 MG/1
25 TABLET ORAL EVERY 8 HOURS PRN
Qty: 30 TABLET | Refills: 2 | Status: SHIPPED | OUTPATIENT
Start: 2024-02-08 | End: 2024-03-09

## 2024-02-08 RX ORDER — VENLAFAXINE HYDROCHLORIDE 150 MG/1
150 CAPSULE, EXTENDED RELEASE ORAL DAILY
Qty: 90 CAPSULE | Refills: 1 | Status: SHIPPED | OUTPATIENT
Start: 2024-02-08

## 2024-02-08 ASSESSMENT — ENCOUNTER SYMPTOMS
RHINORRHEA: 0
COUGH: 0
EYE DISCHARGE: 0
SORE THROAT: 0
ABDOMINAL DISTENTION: 0
SHORTNESS OF BREATH: 0

## 2024-09-16 ENCOUNTER — OFFICE VISIT (OUTPATIENT)
Dept: NEUROLOGY | Age: 34
End: 2024-09-16
Payer: MEDICARE

## 2024-09-16 VITALS — HEART RATE: 86 BPM | OXYGEN SATURATION: 98 % | DIASTOLIC BLOOD PRESSURE: 75 MMHG | SYSTOLIC BLOOD PRESSURE: 111 MMHG

## 2024-09-16 DIAGNOSIS — R29.898 LEFT LEG WEAKNESS: ICD-10-CM

## 2024-09-16 DIAGNOSIS — Z79.899 MEDICATION MANAGEMENT: ICD-10-CM

## 2024-09-16 DIAGNOSIS — G43.709 CHRONIC MIGRAINE WITHOUT AURA WITHOUT STATUS MIGRAINOSUS, NOT INTRACTABLE: Primary | ICD-10-CM

## 2024-09-16 DIAGNOSIS — F44.7 FUNCTIONAL NEUROLOGICAL SYMPTOM DISORDER WITH MIXED SYMPTOMS: ICD-10-CM

## 2024-09-16 PROCEDURE — 99215 OFFICE O/P EST HI 40 MIN: CPT | Performed by: PSYCHIATRY & NEUROLOGY

## 2024-09-16 RX ORDER — SEMAGLUTIDE 0.25 MG/.5ML
0.25 INJECTION, SOLUTION SUBCUTANEOUS
COMMUNITY
Start: 2024-07-18

## 2024-09-16 RX ORDER — TOPIRAMATE 50 MG/1
50 TABLET, FILM COATED ORAL 2 TIMES DAILY
Qty: 180 TABLET | Refills: 1 | Status: SHIPPED | OUTPATIENT
Start: 2024-09-16 | End: 2025-03-15

## 2024-09-16 RX ORDER — MELOXICAM 15 MG/1
15 TABLET ORAL DAILY
COMMUNITY
Start: 2024-09-11

## 2024-09-16 RX ORDER — CELECOXIB 200 MG/1
200 CAPSULE ORAL 2 TIMES DAILY
COMMUNITY
Start: 2024-07-21

## 2024-09-16 ASSESSMENT — ENCOUNTER SYMPTOMS
RHINORRHEA: 0
SORE THROAT: 0
COUGH: 0
ABDOMINAL DISTENTION: 0
EYE DISCHARGE: 0
SHORTNESS OF BREATH: 0

## 2024-09-18 ENCOUNTER — TELEPHONE (OUTPATIENT)
Dept: NEUROLOGY | Age: 34
End: 2024-09-18

## 2024-09-19 RX ORDER — SODIUM CHLORIDE 9 MG/ML
5-250 INJECTION, SOLUTION INTRAVENOUS PRN
OUTPATIENT
Start: 2024-09-19

## 2024-09-19 RX ORDER — HEPARIN 100 UNIT/ML
500 SYRINGE INTRAVENOUS PRN
OUTPATIENT
Start: 2024-09-19

## 2024-09-19 RX ORDER — EPINEPHRINE 1 MG/ML
0.3 INJECTION, SOLUTION, CONCENTRATE INTRAVENOUS PRN
OUTPATIENT
Start: 2024-09-19

## 2024-09-19 RX ORDER — SODIUM CHLORIDE 0.9 % (FLUSH) 0.9 %
5-40 SYRINGE (ML) INJECTION PRN
OUTPATIENT
Start: 2024-09-19

## 2024-09-19 RX ORDER — ONDANSETRON 2 MG/ML
8 INJECTION INTRAMUSCULAR; INTRAVENOUS
OUTPATIENT
Start: 2024-09-19

## 2024-09-19 RX ORDER — DIPHENHYDRAMINE HCL 25 MG
25 CAPSULE ORAL ONCE
Start: 2024-09-19

## 2024-09-19 RX ORDER — DIPHENHYDRAMINE HYDROCHLORIDE 50 MG/ML
50 INJECTION INTRAMUSCULAR; INTRAVENOUS
OUTPATIENT
Start: 2024-09-19

## 2024-09-19 RX ORDER — ALBUTEROL SULFATE 90 UG/1
4 INHALANT RESPIRATORY (INHALATION) PRN
OUTPATIENT
Start: 2024-09-19

## 2024-09-19 RX ORDER — ACETAMINOPHEN 325 MG/1
650 TABLET ORAL
OUTPATIENT
Start: 2024-09-19

## 2024-09-19 RX ORDER — ACETAMINOPHEN 325 MG/1
650 TABLET ORAL ONCE
Start: 2024-09-19

## 2024-09-24 RX ORDER — DIPHENHYDRAMINE HYDROCHLORIDE 50 MG/ML
50 INJECTION INTRAMUSCULAR; INTRAVENOUS
OUTPATIENT
Start: 2024-09-24

## 2024-09-24 RX ORDER — ALBUTEROL SULFATE 90 UG/1
4 INHALANT RESPIRATORY (INHALATION) PRN
OUTPATIENT
Start: 2024-09-24

## 2024-09-24 RX ORDER — HEPARIN 100 UNIT/ML
500 SYRINGE INTRAVENOUS PRN
OUTPATIENT
Start: 2024-09-24

## 2024-09-24 RX ORDER — SODIUM CHLORIDE 9 MG/ML
5-250 INJECTION, SOLUTION INTRAVENOUS PRN
OUTPATIENT
Start: 2024-09-24

## 2024-09-24 RX ORDER — SODIUM CHLORIDE 0.9 % (FLUSH) 0.9 %
5-40 SYRINGE (ML) INJECTION PRN
OUTPATIENT
Start: 2024-09-24

## 2024-09-24 RX ORDER — EPINEPHRINE 1 MG/ML
0.3 INJECTION, SOLUTION, CONCENTRATE INTRAVENOUS PRN
OUTPATIENT
Start: 2024-09-24

## 2024-09-24 RX ORDER — ONDANSETRON 2 MG/ML
8 INJECTION INTRAMUSCULAR; INTRAVENOUS
OUTPATIENT
Start: 2024-09-24

## 2024-09-24 RX ORDER — SODIUM CHLORIDE 9 MG/ML
INJECTION, SOLUTION INTRAVENOUS CONTINUOUS
OUTPATIENT
Start: 2024-09-24

## 2024-09-24 RX ORDER — ACETAMINOPHEN 325 MG/1
650 TABLET ORAL
OUTPATIENT
Start: 2024-09-24

## 2024-10-07 ENCOUNTER — NURSE ONLY (OUTPATIENT)
Age: 34
End: 2024-10-07
Payer: MEDICARE

## 2024-10-07 DIAGNOSIS — G43.009 MIGRAINE WITHOUT AURA AND WITHOUT STATUS MIGRAINOSUS, NOT INTRACTABLE: Primary | ICD-10-CM

## 2024-10-07 PROCEDURE — 96365 THER/PROPH/DIAG IV INF INIT: CPT | Performed by: PSYCHIATRY & NEUROLOGY

## 2024-10-07 RX ORDER — ACETAMINOPHEN 325 MG/1
650 TABLET ORAL
Status: DISCONTINUED | OUTPATIENT
Start: 2024-10-07 | End: 2024-10-07 | Stop reason: HOSPADM

## 2024-10-07 RX ORDER — ONDANSETRON 2 MG/ML
8 INJECTION INTRAMUSCULAR; INTRAVENOUS
OUTPATIENT
Start: 2024-12-30

## 2024-10-07 RX ORDER — ACETAMINOPHEN 325 MG/1
650 TABLET ORAL
OUTPATIENT
Start: 2024-12-30

## 2024-10-07 RX ORDER — SODIUM CHLORIDE 9 MG/ML
5-250 INJECTION, SOLUTION INTRAVENOUS PRN
OUTPATIENT
Start: 2024-12-30

## 2024-10-07 RX ORDER — ALBUTEROL SULFATE 90 UG/1
4 INHALANT RESPIRATORY (INHALATION) PRN
Status: DISCONTINUED | OUTPATIENT
Start: 2024-10-07 | End: 2024-10-07 | Stop reason: HOSPADM

## 2024-10-07 RX ORDER — SODIUM CHLORIDE 0.9 % (FLUSH) 0.9 %
5-40 SYRINGE (ML) INJECTION PRN
Status: DISCONTINUED | OUTPATIENT
Start: 2024-10-07 | End: 2024-10-07 | Stop reason: HOSPADM

## 2024-10-07 RX ORDER — DIPHENHYDRAMINE HYDROCHLORIDE 50 MG/ML
50 INJECTION INTRAMUSCULAR; INTRAVENOUS
Status: DISCONTINUED | OUTPATIENT
Start: 2024-10-07 | End: 2024-10-07 | Stop reason: HOSPADM

## 2024-10-07 RX ORDER — SODIUM CHLORIDE 9 MG/ML
5-250 INJECTION, SOLUTION INTRAVENOUS PRN
Status: DISCONTINUED | OUTPATIENT
Start: 2024-10-07 | End: 2024-10-07 | Stop reason: HOSPADM

## 2024-10-07 RX ORDER — HEPARIN 100 UNIT/ML
500 SYRINGE INTRAVENOUS PRN
Status: DISCONTINUED | OUTPATIENT
Start: 2024-10-07 | End: 2024-10-07 | Stop reason: HOSPADM

## 2024-10-07 RX ORDER — ALBUTEROL SULFATE 90 UG/1
4 INHALANT RESPIRATORY (INHALATION) PRN
OUTPATIENT
Start: 2024-12-30

## 2024-10-07 RX ORDER — DIPHENHYDRAMINE HYDROCHLORIDE 50 MG/ML
50 INJECTION INTRAMUSCULAR; INTRAVENOUS
OUTPATIENT
Start: 2024-12-30

## 2024-10-07 RX ORDER — EPINEPHRINE 1 MG/ML
0.3 INJECTION, SOLUTION, CONCENTRATE INTRAVENOUS PRN
Status: DISCONTINUED | OUTPATIENT
Start: 2024-10-07 | End: 2024-10-07 | Stop reason: HOSPADM

## 2024-10-07 RX ORDER — HEPARIN 100 UNIT/ML
500 SYRINGE INTRAVENOUS PRN
OUTPATIENT
Start: 2024-12-30

## 2024-10-07 RX ORDER — SODIUM CHLORIDE 0.9 % (FLUSH) 0.9 %
5-40 SYRINGE (ML) INJECTION PRN
OUTPATIENT
Start: 2024-12-30

## 2024-10-07 RX ORDER — EPINEPHRINE 1 MG/ML
0.3 INJECTION, SOLUTION, CONCENTRATE INTRAVENOUS PRN
OUTPATIENT
Start: 2024-12-30

## 2024-10-07 RX ORDER — ONDANSETRON 2 MG/ML
8 INJECTION INTRAMUSCULAR; INTRAVENOUS
Status: DISCONTINUED | OUTPATIENT
Start: 2024-10-07 | End: 2024-10-07 | Stop reason: HOSPADM

## 2024-10-07 RX ORDER — SODIUM CHLORIDE 9 MG/ML
INJECTION, SOLUTION INTRAVENOUS CONTINUOUS
OUTPATIENT
Start: 2024-12-30

## 2024-10-07 RX ORDER — SODIUM CHLORIDE 9 MG/ML
INJECTION, SOLUTION INTRAVENOUS CONTINUOUS
Status: DISCONTINUED | OUTPATIENT
Start: 2024-10-07 | End: 2024-10-07 | Stop reason: HOSPADM

## 2024-10-07 NOTE — PROGRESS NOTES
[facility]  [address] [facility], [address]  [place] [address]  Office : [phone], Fax: [phone]     Patient arrived via wheelchair with support dog to the infusion suite today for a(n) Vyepti infusion. Vital signs WNL. No contraindications noted. Patient offered warm blanket and pillow for comfort. Patient up ad bertha to BR; offered drink and snacks during visit.    24 g 1\" IV placed in the forearm x 1 attempt; flushed with 10ml NS. Patient tolerated well.   Vyepti 100mg administered and titrated per orders at  200  ml/hr. Infusion Time: 32 minutes.   Patient tolerated the infusion well, no complications noted.   No observation  required/recommended. Post infusion vital signs WNL.      PIV flushed with 10ml NS and removed without difficulty, catheter intact; dressing applied. Patient instructed to leave the dressing on for at least 30 minutes before removal.         Patient discharged  in wheelchair with support animal out of infusion suite, feeling well. Patient instructed to call the ordering provider with any post-infusion issues.     Next appointment scheduled at a date/time convenient for them prior to patient's departure today.

## 2024-10-28 ENCOUNTER — TELEPHONE (OUTPATIENT)
Dept: NEUROLOGY | Age: 34
End: 2024-10-28

## 2024-10-28 NOTE — TELEPHONE ENCOUNTER
Pt called back and is asking if she can continue taking her Emgality injection after having her infusion? She stated that she is still having HA's and does not want to take the Ubrelvy every day.    Report given for transfer of care to [facility] None

## 2024-11-01 ENCOUNTER — CLINICAL DOCUMENTATION (OUTPATIENT)
Facility: HOSPITAL | Age: 34
End: 2024-11-01

## 2024-11-01 NOTE — PROGRESS NOTES
Spoke to patient regarding financial assistance for her Vyepti infusion. Patient stated they were denied in the past because their income was too high. Her  is now currently not working. I informed her she should reapply with their income changing. [facility] I gave her my direct line number for any questions in the future.

## 2024-11-07 ENCOUNTER — CLINICAL DOCUMENTATION (OUTPATIENT)
Facility: HOSPITAL | Age: 34
End: 2024-11-07

## 2024-11-20 ENCOUNTER — TELEPHONE (OUTPATIENT)
Dept: NEUROLOGY | Age: 34
End: 2024-11-20

## 2024-11-20 NOTE — TELEPHONE ENCOUNTER
Patient called to clarify the previous messages between her and Dr Nayak. Patient is still having extreme headaches. Patient wants to know if Dr Nayak will put in an order for migraine therapy infusions, however, with her schedule, she is not able to come in until after Thanksgiving. She is also concerned about taking the infusions if her headache is gone.

## 2024-11-21 ENCOUNTER — TELEPHONE (OUTPATIENT)
Dept: NEUROLOGY | Age: 34
End: 2024-11-21

## 2024-11-21 NOTE — TELEPHONE ENCOUNTER
Pt would like a \"migraine cocktail\". Pt also wants to confirm her next infusion 1/13/24 is the higher dose of Vyepti? Pt also would like to come in for a sooner follow up.

## 2024-11-22 ENCOUNTER — TELEPHONE (OUTPATIENT)
Dept: NEUROLOGY | Age: 34
End: 2024-11-22

## 2024-11-22 RX ORDER — SODIUM CHLORIDE 9 MG/ML
5-250 INJECTION, SOLUTION INTRAVENOUS PRN
OUTPATIENT
Start: 2024-11-22

## 2024-11-22 RX ORDER — DIPHENHYDRAMINE HYDROCHLORIDE 50 MG/ML
50 INJECTION INTRAMUSCULAR; INTRAVENOUS
OUTPATIENT
Start: 2024-11-22

## 2024-11-22 RX ORDER — KETOROLAC TROMETHAMINE 30 MG/ML
60 INJECTION, SOLUTION INTRAMUSCULAR; INTRAVENOUS ONCE
OUTPATIENT
Start: 2024-11-22 | End: 2024-11-22

## 2024-11-22 RX ORDER — ALBUTEROL SULFATE 90 UG/1
4 INHALANT RESPIRATORY (INHALATION) PRN
OUTPATIENT
Start: 2024-11-22

## 2024-11-22 RX ORDER — HEPARIN 100 UNIT/ML
500 SYRINGE INTRAVENOUS PRN
OUTPATIENT
Start: 2024-11-22

## 2024-11-22 RX ORDER — ONDANSETRON 2 MG/ML
8 INJECTION INTRAMUSCULAR; INTRAVENOUS
OUTPATIENT
Start: 2024-11-22

## 2024-11-22 RX ORDER — SODIUM CHLORIDE 0.9 % (FLUSH) 0.9 %
5-40 SYRINGE (ML) INJECTION PRN
OUTPATIENT
Start: 2024-11-22

## 2024-11-22 RX ORDER — HYDROCORTISONE SODIUM SUCCINATE 100 MG/2ML
100 INJECTION INTRAMUSCULAR; INTRAVENOUS
OUTPATIENT
Start: 2024-11-22

## 2024-11-22 RX ORDER — SODIUM CHLORIDE 9 MG/ML
INJECTION, SOLUTION INTRAVENOUS CONTINUOUS
OUTPATIENT
Start: 2024-11-22

## 2024-11-22 RX ORDER — MAGNESIUM SULFATE IN WATER 40 MG/ML
2000 INJECTION, SOLUTION INTRAVENOUS ONCE
OUTPATIENT
Start: 2024-11-22

## 2024-11-22 RX ORDER — EPINEPHRINE 1 MG/ML
0.3 INJECTION, SOLUTION, CONCENTRATE INTRAVENOUS PRN
OUTPATIENT
Start: 2024-11-22

## 2024-11-22 RX ORDER — ACETAMINOPHEN 325 MG/1
650 TABLET ORAL
OUTPATIENT
Start: 2024-11-22

## 2024-11-22 RX ORDER — 0.9 % SODIUM CHLORIDE 0.9 %
1000 INTRAVENOUS SOLUTION INTRAVENOUS ONCE
OUTPATIENT
Start: 2024-11-22 | End: 2024-11-22

## 2024-11-22 NOTE — TELEPHONE ENCOUNTER
Contacted Patient to schedule the Migraine Therapy; Patient is leaving for out of town this weekend and will be gone for one week. Patient is scheduled for 12/3 and 12/5, based upon her other medical appointments.

## 2024-11-25 ENCOUNTER — PREP FOR PROCEDURE (OUTPATIENT)
Dept: SURGERY | Age: 34
End: 2024-11-25

## 2024-11-25 RX ORDER — SODIUM CHLORIDE 9 MG/ML
INJECTION, SOLUTION INTRAVENOUS CONTINUOUS
OUTPATIENT
Start: 2024-11-25

## 2024-11-25 RX ORDER — SODIUM CHLORIDE 9 MG/ML
5-250 INJECTION, SOLUTION INTRAVENOUS PRN
OUTPATIENT
Start: 2024-11-25

## 2024-11-25 RX ORDER — HEPARIN 100 UNIT/ML
500 SYRINGE INTRAVENOUS PRN
OUTPATIENT
Start: 2024-11-25

## 2024-11-25 RX ORDER — ONDANSETRON 2 MG/ML
8 INJECTION INTRAMUSCULAR; INTRAVENOUS
OUTPATIENT
Start: 2024-11-25

## 2024-11-25 RX ORDER — SODIUM CHLORIDE 0.9 % (FLUSH) 0.9 %
5-40 SYRINGE (ML) INJECTION PRN
OUTPATIENT
Start: 2024-11-25

## 2024-11-25 RX ORDER — EPINEPHRINE 1 MG/ML
0.3 INJECTION, SOLUTION, CONCENTRATE INTRAVENOUS PRN
OUTPATIENT
Start: 2024-11-25

## 2024-11-25 RX ORDER — ACETAMINOPHEN 325 MG/1
650 TABLET ORAL
OUTPATIENT
Start: 2024-11-25

## 2024-11-25 RX ORDER — HYDROCORTISONE SODIUM SUCCINATE 100 MG/2ML
100 INJECTION INTRAMUSCULAR; INTRAVENOUS
OUTPATIENT
Start: 2024-11-25

## 2024-11-25 RX ORDER — ALBUTEROL SULFATE 90 UG/1
4 INHALANT RESPIRATORY (INHALATION) PRN
OUTPATIENT
Start: 2024-11-25

## 2024-11-25 RX ORDER — DIPHENHYDRAMINE HYDROCHLORIDE 50 MG/ML
50 INJECTION INTRAMUSCULAR; INTRAVENOUS
OUTPATIENT
Start: 2024-11-25

## 2024-12-10 ENCOUNTER — CLINICAL DOCUMENTATION (OUTPATIENT)
Facility: HOSPITAL | Age: 34
End: 2024-12-10

## 2024-12-10 NOTE — PROGRESS NOTES
Spoke to patient in regards to financial assistance. She will email me the application and I will get in turned in.

## 2024-12-13 ENCOUNTER — CLINICAL DOCUMENTATION (OUTPATIENT)
Facility: HOSPITAL | Age: 34
End: 2024-12-13

## 2024-12-13 NOTE — PROGRESS NOTES
Spoke to patient to let her know I did receive the FAP information and was able to open all of the documents. Lita will get them uploaded once she returns to work

## 2025-01-06 ENCOUNTER — CLINICAL DOCUMENTATION (OUTPATIENT)
Facility: HOSPITAL | Age: 35
End: 2025-01-06

## 2025-01-06 NOTE — PROGRESS NOTES
Spoke to patient in regards to financial assistance. She was approved for a partial write off. I informed her the partial write off would be 81%. I informed her someone has to manually go in and add the financial assistance to the accounts so it may take a while before it is applied. She changed insurances and now has [employer]. She will update her insurance on [employer] so they can start the PA prior to her appointment.

## 2025-01-07 RX ORDER — ACETAMINOPHEN 325 MG/1
650 TABLET ORAL ONCE
Start: 2025-01-07

## 2025-01-07 RX ORDER — DIPHENHYDRAMINE HYDROCHLORIDE 50 MG/ML
50 INJECTION INTRAMUSCULAR; INTRAVENOUS
OUTPATIENT
Start: 2025-01-07

## 2025-01-07 RX ORDER — ONDANSETRON 2 MG/ML
8 INJECTION INTRAMUSCULAR; INTRAVENOUS
OUTPATIENT
Start: 2025-01-07

## 2025-01-07 RX ORDER — SODIUM CHLORIDE 0.9 % (FLUSH) 0.9 %
5-40 SYRINGE (ML) INJECTION PRN
OUTPATIENT
Start: 2025-01-07

## 2025-01-07 RX ORDER — SODIUM CHLORIDE 9 MG/ML
INJECTION, SOLUTION INTRAVENOUS CONTINUOUS
OUTPATIENT
Start: 2025-01-07

## 2025-01-07 RX ORDER — EPINEPHRINE 1 MG/ML
0.3 INJECTION, SOLUTION, CONCENTRATE INTRAVENOUS PRN
OUTPATIENT
Start: 2025-01-07

## 2025-01-07 RX ORDER — ALBUTEROL SULFATE 90 UG/1
4 INHALANT RESPIRATORY (INHALATION) PRN
OUTPATIENT
Start: 2025-01-07

## 2025-01-07 RX ORDER — HYDROCORTISONE SODIUM SUCCINATE 100 MG/2ML
100 INJECTION INTRAMUSCULAR; INTRAVENOUS
OUTPATIENT
Start: 2025-01-07

## 2025-01-07 RX ORDER — SODIUM CHLORIDE 9 MG/ML
5-250 INJECTION, SOLUTION INTRAVENOUS PRN
OUTPATIENT
Start: 2025-01-07

## 2025-01-07 RX ORDER — HEPARIN 100 UNIT/ML
500 SYRINGE INTRAVENOUS PRN
OUTPATIENT
Start: 2025-01-07

## 2025-01-07 RX ORDER — DIPHENHYDRAMINE HCL 25 MG
25 CAPSULE ORAL ONCE
Start: 2025-01-07

## 2025-01-07 RX ORDER — ACETAMINOPHEN 325 MG/1
650 TABLET ORAL
OUTPATIENT
Start: 2025-01-07

## 2025-01-09 ENCOUNTER — TELEPHONE (OUTPATIENT)
Dept: NEUROLOGY | Age: 35
End: 2025-01-09

## 2025-01-09 NOTE — TELEPHONE ENCOUNTER
PT called wanting to increase dosage of Vyepti. Pt reports she does not want to come in on Monday and get the same dose of Vyepti she got in October. Pt reports she needs new PA for migraine cocktails and the increased dosage of vyepti. She would like her infusions done at home instead of the infusion clinic.

## 2025-01-09 NOTE — TELEPHONE ENCOUNTER
Talked with Infusion they found an order for 300mg of Vyepti pt is agreeable to come for that dose on Monday. I informed pt we would work on getting the out of pocket cost for her. Pt reports she will speak to Dr. Nayak about transitioning to home infusions. Pt also wants it noted her Ubrelvy is 400 dollars and would like other options.

## 2025-01-14 ENCOUNTER — TELEPHONE (OUTPATIENT)
Dept: NEUROLOGY | Age: 35
End: 2025-01-14

## 2025-01-14 DIAGNOSIS — G43.709 CHRONIC MIGRAINE WITHOUT AURA WITHOUT STATUS MIGRAINOSUS, NOT INTRACTABLE: Primary | ICD-10-CM

## 2025-01-14 RX ORDER — UBROGEPANT 100 MG/1
100 TABLET ORAL PRN
Qty: 16 TABLET | Refills: 5 | Status: SHIPPED | OUTPATIENT
Start: 2025-01-14

## 2025-01-20 ENCOUNTER — OFFICE VISIT (OUTPATIENT)
Dept: NEUROLOGY | Age: 35
End: 2025-01-20
Payer: MEDICARE

## 2025-01-20 VITALS
OXYGEN SATURATION: 98 % | DIASTOLIC BLOOD PRESSURE: 74 MMHG | WEIGHT: 228 LBS | HEART RATE: 97 BPM | BODY MASS INDEX: 39.14 KG/M2 | SYSTOLIC BLOOD PRESSURE: 108 MMHG

## 2025-01-20 DIAGNOSIS — Z79.899 MEDICATION MANAGEMENT: ICD-10-CM

## 2025-01-20 DIAGNOSIS — G43.709 CHRONIC MIGRAINE WITHOUT AURA WITHOUT STATUS MIGRAINOSUS, NOT INTRACTABLE: Primary | ICD-10-CM

## 2025-01-20 DIAGNOSIS — F44.7 FUNCTIONAL NEUROLOGICAL SYMPTOM DISORDER WITH MIXED SYMPTOMS: ICD-10-CM

## 2025-01-20 PROCEDURE — 99215 OFFICE O/P EST HI 40 MIN: CPT | Performed by: PSYCHIATRY & NEUROLOGY

## 2025-01-20 RX ORDER — NALOXEGOL OXALATE 25 MG/1
25 TABLET, FILM COATED ORAL DAILY
COMMUNITY
Start: 2024-12-19

## 2025-01-20 ASSESSMENT — ENCOUNTER SYMPTOMS
SORE THROAT: 0
SHORTNESS OF BREATH: 0
COUGH: 0
RHINORRHEA: 0
ABDOMINAL DISTENTION: 0
EYE DISCHARGE: 0

## 2025-01-20 NOTE — PROGRESS NOTES
[facility] NEUROLOGY FOLLOW-UP NOTE    Patient: Chikis Villa  Physician: Jessica Nayak MD    CC:   Chief Complaint   Patient presents with    Follow-up     Follow up on HA. No improvement. Having to take Ubrelvy more often. Concerns about prices of medications. Have not been able to get HA cocktails, wants approval to get infusions at home per insurance. Still having seizures     Seizures       PCP: Sam Engel MD  Referring Provider: No ref. provider found    History of Present Illness:     Interval History on 1/20/2025:  Chikis Villa is a 34 y.o. right-handed female who presents for follow-up management of chronic intractable migraine headaches.  Patient does not report any improvement and she continues to have very high migraine burden.  She had to postpone her Vyepti increased dose infusion to tomorrow from last week.  She recently changed insurance and we discussed that patient might be eligible to Botox with less co-pays and before.  Will explore that option further with her.  She is also enrolling into Ubrelvy patient assistant program since Ubrelvy co-pay is very high for her.  She is also looking into moving all of her infusions to home.  We talked with the patient that once all supporting staff is back tomorrow in the office I will talk with him how to get that started for her.      Interval history on 9-2024:  Chikis Villa is a 34 y.o. right-handed female who presents for follow-up management of chronic intractable migraine headaches.    Patient reports increase in headache frequency since she had surgery on July 23.  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  While over the maternity leave her primary care to go over and switch her from Qulipta to Emgality.  She reports initial improvement with Emgality but it stopped working as well after she had surgery.  She reports 2-3 headache episodes per week.  She denies any change in headache

## 2025-01-23 ENCOUNTER — TELEPHONE (OUTPATIENT)
Dept: NEUROLOGY | Age: 35
End: 2025-01-23

## 2025-01-23 NOTE — TELEPHONE ENCOUNTER
[facility] cannot perform compound for migraine cocktail and pt cannot afford Vyepti in clinic or home infusion.

## 2025-01-27 ENCOUNTER — NURSE ONLY (OUTPATIENT)
Age: 35
End: 2025-01-27
Payer: MEDICARE

## 2025-01-27 VITALS
HEART RATE: 86 BPM | SYSTOLIC BLOOD PRESSURE: 127 MMHG | DIASTOLIC BLOOD PRESSURE: 86 MMHG | RESPIRATION RATE: 16 BRPM | OXYGEN SATURATION: 98 % | TEMPERATURE: 97.2 F

## 2025-01-27 DIAGNOSIS — G43.709 CHRONIC MIGRAINE WITHOUT AURA WITHOUT STATUS MIGRAINOSUS, NOT INTRACTABLE: Primary | ICD-10-CM

## 2025-01-27 PROCEDURE — 96365 THER/PROPH/DIAG IV INF INIT: CPT | Performed by: PSYCHIATRY & NEUROLOGY

## 2025-01-27 RX ORDER — ALBUTEROL SULFATE 90 UG/1
4 INHALANT RESPIRATORY (INHALATION) PRN
Status: DISCONTINUED | OUTPATIENT
Start: 2025-01-27 | End: 2025-01-27 | Stop reason: HOSPADM

## 2025-01-27 RX ORDER — HEPARIN 100 UNIT/ML
500 SYRINGE INTRAVENOUS PRN
Status: DISCONTINUED | OUTPATIENT
Start: 2025-01-27 | End: 2025-01-27 | Stop reason: HOSPADM

## 2025-01-27 RX ORDER — ALBUTEROL SULFATE 90 UG/1
4 INHALANT RESPIRATORY (INHALATION) PRN
OUTPATIENT
Start: 2025-04-21

## 2025-01-27 RX ORDER — EPINEPHRINE 1 MG/ML
0.3 INJECTION, SOLUTION, CONCENTRATE INTRAVENOUS PRN
Status: DISCONTINUED | OUTPATIENT
Start: 2025-01-27 | End: 2025-01-27 | Stop reason: HOSPADM

## 2025-01-27 RX ORDER — EPINEPHRINE 1 MG/ML
0.3 INJECTION, SOLUTION, CONCENTRATE INTRAVENOUS PRN
OUTPATIENT
Start: 2025-04-21

## 2025-01-27 RX ORDER — SODIUM CHLORIDE 9 MG/ML
5-250 INJECTION, SOLUTION INTRAVENOUS PRN
Status: DISCONTINUED | OUTPATIENT
Start: 2025-01-27 | End: 2025-01-27 | Stop reason: HOSPADM

## 2025-01-27 RX ORDER — ACETAMINOPHEN 325 MG/1
650 TABLET ORAL
OUTPATIENT
Start: 2025-04-21

## 2025-01-27 RX ORDER — SODIUM CHLORIDE 0.9 % (FLUSH) 0.9 %
5-40 SYRINGE (ML) INJECTION PRN
OUTPATIENT
Start: 2025-04-21

## 2025-01-27 RX ORDER — ACETAMINOPHEN 325 MG/1
650 TABLET ORAL ONCE
Start: 2025-04-21 | End: 2025-04-21

## 2025-01-27 RX ORDER — ONDANSETRON 2 MG/ML
8 INJECTION INTRAMUSCULAR; INTRAVENOUS
OUTPATIENT
Start: 2025-04-21

## 2025-01-27 RX ORDER — HYDROCORTISONE SODIUM SUCCINATE 100 MG/2ML
100 INJECTION INTRAMUSCULAR; INTRAVENOUS
Status: DISCONTINUED | OUTPATIENT
Start: 2025-01-27 | End: 2025-01-27 | Stop reason: HOSPADM

## 2025-01-27 RX ORDER — DIPHENHYDRAMINE HYDROCHLORIDE 50 MG/ML
50 INJECTION INTRAMUSCULAR; INTRAVENOUS
Status: DISCONTINUED | OUTPATIENT
Start: 2025-01-27 | End: 2025-01-27 | Stop reason: HOSPADM

## 2025-01-27 RX ORDER — SODIUM CHLORIDE 9 MG/ML
INJECTION, SOLUTION INTRAVENOUS CONTINUOUS
Status: DISCONTINUED | OUTPATIENT
Start: 2025-01-27 | End: 2025-01-27 | Stop reason: HOSPADM

## 2025-01-27 RX ORDER — HYDROCORTISONE SODIUM SUCCINATE 100 MG/2ML
100 INJECTION INTRAMUSCULAR; INTRAVENOUS
OUTPATIENT
Start: 2025-04-21

## 2025-01-27 RX ORDER — DIPHENHYDRAMINE HCL 25 MG
25 CAPSULE ORAL ONCE
Status: COMPLETED | OUTPATIENT
Start: 2025-01-27 | End: 2025-01-27

## 2025-01-27 RX ORDER — ONDANSETRON 2 MG/ML
8 INJECTION INTRAMUSCULAR; INTRAVENOUS
Status: DISCONTINUED | OUTPATIENT
Start: 2025-01-27 | End: 2025-01-27 | Stop reason: HOSPADM

## 2025-01-27 RX ORDER — SODIUM CHLORIDE 9 MG/ML
5-250 INJECTION, SOLUTION INTRAVENOUS PRN
OUTPATIENT
Start: 2025-04-21

## 2025-01-27 RX ORDER — ACETAMINOPHEN 325 MG/1
650 TABLET ORAL
Status: DISCONTINUED | OUTPATIENT
Start: 2025-01-27 | End: 2025-01-27 | Stop reason: HOSPADM

## 2025-01-27 RX ORDER — DIPHENHYDRAMINE HCL 25 MG
25 CAPSULE ORAL ONCE
Start: 2025-04-21 | End: 2025-04-21

## 2025-01-27 RX ORDER — DIPHENHYDRAMINE HYDROCHLORIDE 50 MG/ML
50 INJECTION INTRAMUSCULAR; INTRAVENOUS
OUTPATIENT
Start: 2025-04-21

## 2025-01-27 RX ORDER — ACETAMINOPHEN 325 MG/1
650 TABLET ORAL ONCE
Status: COMPLETED | OUTPATIENT
Start: 2025-01-27 | End: 2025-01-27

## 2025-01-27 RX ORDER — SODIUM CHLORIDE 0.9 % (FLUSH) 0.9 %
5-40 SYRINGE (ML) INJECTION PRN
Status: DISCONTINUED | OUTPATIENT
Start: 2025-01-27 | End: 2025-01-27 | Stop reason: HOSPADM

## 2025-01-27 RX ORDER — SODIUM CHLORIDE 9 MG/ML
INJECTION, SOLUTION INTRAVENOUS CONTINUOUS
OUTPATIENT
Start: 2025-04-21

## 2025-01-27 RX ORDER — HEPARIN 100 UNIT/ML
500 SYRINGE INTRAVENOUS PRN
OUTPATIENT
Start: 2025-04-21

## 2025-01-27 RX ADMIN — Medication 25 MG: at 10:15

## 2025-01-27 RX ADMIN — ACETAMINOPHEN 650 MG: 325 TABLET ORAL at 10:15

## 2025-01-27 NOTE — PROGRESS NOTES
[facility]  [address] [facility] [address], [address] [place] [address]  Office : [phone], Fax: [phone]       Patient arrived ambulatory with walker to the neurology infusion suite today for a(n) Vyepti infusion. Vital signs WNL. No contraindications noted. Provided offered warm blanket, pillow, drinks and snacks for comfort. Up ad bertha to restroom.     24g 1\" IV catheter placed in the left posterior hand x1 attempt(s), flushed with 10ml NS. Patient tolerated well.   Pre-medications given PO per orders.  Vyepti 300mg in 100mL NS administered at 200ml/hr per orders. Infusion time: 30 minutes .  Patient tolerated the infusion well, no complications noted.   Patient required no observation post infusion.    PIV flushed with 10ml NS, removed, and catheter intact; dressing applied. Patient tolerated well and educated to leave dressing on for 30 minutes before removal.     Patient ambulatory with walker out of infusion suite, discharged feeling well.  Patient instructed to call the ordering provider with any post-infusion issues.    Next appointment scheduled prior to patient departing from infusion suite today.

## 2025-01-28 ENCOUNTER — TELEPHONE (OUTPATIENT)
Dept: NEUROLOGY | Age: 35
End: 2025-01-28

## 2025-01-28 NOTE — TELEPHONE ENCOUNTER
Pt reports AIMOVIG and Emgalilty were not the best. Pt reports she just had 300mg Vyepti yesterday and will keep us updated. Pt reports she is interested in Botox after surgery  but it depends on the cost.

## 2025-01-31 ENCOUNTER — TELEPHONE (OUTPATIENT)
Dept: NEUROLOGY | Age: 35
End: 2025-01-31

## 2025-02-12 ENCOUNTER — TELEPHONE (OUTPATIENT)
Dept: NEUROLOGY | Age: 35
End: 2025-02-12

## 2025-02-12 NOTE — TELEPHONE ENCOUNTER
Called and LVM with patient  to go over Botox benefits and to schedule Botox appointment.  Estimate cost is $283.80 that includes Botox (20%) and $25 copay for Admin (injection) fee.   Patient does not have a deductible but a 20% coinsurance  Patient will be covered at 100% once she meets her OOP of $6,350  $75.72 has been met so far.

## 2025-03-10 ENCOUNTER — TELEPHONE (OUTPATIENT)
Dept: NEUROLOGY | Age: 35
End: 2025-03-10

## 2025-03-10 NOTE — TELEPHONE ENCOUNTER
Pt called regarding Ubrelvy financial assistance, advised she was approved and to contact [facility] for refills. She was unaware she was supposed to contact [facility] directly so she is currently out of Ubrelvy, I will be leaving samples with  for her  to  for her until she can get refill.     Pt also reports she is unable to get Vyepti infusions-financial assistance is unable to assist because of the location she was getting the infusions. Is there another location she can go to get her 3rd infusion?

## 2025-03-11 DIAGNOSIS — G43.709 CHRONIC MIGRAINE WITHOUT AURA WITHOUT STATUS MIGRAINOSUS, NOT INTRACTABLE: ICD-10-CM

## 2025-03-11 RX ORDER — TOPIRAMATE 50 MG/1
50 TABLET, FILM COATED ORAL 2 TIMES DAILY
Qty: 180 TABLET | Refills: 1 | Status: SHIPPED | OUTPATIENT
Start: 2025-03-11

## 2025-03-11 NOTE — TELEPHONE ENCOUNTER
Pt says that she is waiting on a call back from [facility] to find out where she can get the Infusions. I'm unsure why she will not come to [facility] to get infusions but I think it has something to do with her getting upset with them during the Financial Aid approval process if I remember correctly per our last conversation.       Pt will wait to hear back from Bernadette.

## 2025-03-27 ENCOUNTER — TELEMEDICINE (OUTPATIENT)
Dept: NEUROLOGY | Age: 35
End: 2025-03-27
Payer: MEDICARE

## 2025-03-27 DIAGNOSIS — Z79.899 MEDICATION MANAGEMENT: ICD-10-CM

## 2025-03-27 DIAGNOSIS — G43.709 CHRONIC MIGRAINE WITHOUT AURA WITHOUT STATUS MIGRAINOSUS, NOT INTRACTABLE: Primary | ICD-10-CM

## 2025-03-27 DIAGNOSIS — F44.7 FUNCTIONAL NEUROLOGICAL SYMPTOM DISORDER WITH MIXED SYMPTOMS: ICD-10-CM

## 2025-03-27 PROCEDURE — 99214 OFFICE O/P EST MOD 30 MIN: CPT | Performed by: PSYCHIATRY & NEUROLOGY

## 2025-03-27 NOTE — PROGRESS NOTES
[facility] - VIRTUAL VISIT - ENCOUNTER    Patient: Chikis Villa  Physician: Jessica Nayak MD    CC: No chief complaint on file.      PCP: Sam Engel MD  Referring Provider: No ref. provider found    Virtual Encounter     Chikis Villa is a 34 y.o. female who was seen by synchronous (real-time) audio-video technology using Epic MyChart via link on 3/27/2025.    Consent: She and/or her healthcare decision maker is aware that this patient-initiated Telehealth encounter is a billable service, with coverage as determined by her insurance carrier. She is aware that she may receive a bill and has provided verbal consent to proceed: Yes.  I was in the office while conducting this encounter. Patient Location: Patient Home.     Subjective     Interval History on 3/27/2025:  Chikis Villa is a 34 y.o. right-handed female who presents for follow-up of super refractory migraine headaches.  This appointment apparently was only scheduled to discuss some concerns over the fact that patient was not able to get her Vyepti infusion with [facility].  Patient appears to be very frustrated with our supporting staff communication with her.  She states that [facility] discriminated against her because she cannot have like Vyepti infusion done at cancer center and at our other facilities.  Apparently there is also some difficulties with getting Vyepti infusions at our center.   She states that this has been a very difficult process for her to get financial coverage for Vyepti the first place.  However she was very interested in that since Vyepti was helping her.  I did bring up Botox injections which we did touch on since patient had a change in her insurance and it is possible that it will be covered for now water however she does not seem to be interested in that because of past difficulties with getting financial aid at [facility].      1/20/2025:  Chikis Villa is a 34 y.o.

## 2025-03-30 ENCOUNTER — PATIENT MESSAGE (OUTPATIENT)
Dept: NEUROLOGY | Age: 35
End: 2025-03-30

## 2025-03-30 DIAGNOSIS — G43.709 CHRONIC MIGRAINE WITHOUT AURA WITHOUT STATUS MIGRAINOSUS, NOT INTRACTABLE: Primary | ICD-10-CM

## 2025-03-31 ENCOUNTER — TELEPHONE (OUTPATIENT)
Dept: NEUROLOGY | Age: 35
End: 2025-03-31

## 2025-03-31 RX ORDER — PROCHLORPERAZINE MALEATE 5 MG/1
5 TABLET ORAL EVERY 6 HOURS PRN
Qty: 120 TABLET | Refills: 1 | Status: SHIPPED | OUTPATIENT
Start: 2025-03-31

## 2025-04-03 ENCOUNTER — TELEPHONE (OUTPATIENT)
Dept: NEUROLOGY | Age: 35
End: 2025-04-03

## 2025-04-03 NOTE — TELEPHONE ENCOUNTER
Pt would like to be seen sooner because she did not have enough time during last appt to talk about what she needed to. Appt has been moved from 5/22 to 5/1.

## 2025-04-09 ENCOUNTER — TELEPHONE (OUTPATIENT)
Dept: NEUROLOGY | Age: 35
End: 2025-04-09

## 2025-04-09 NOTE — TELEPHONE ENCOUNTER
Patient was seen for a virtual visit on 3/27 with Dr. Nayak. She is requesting a refund of her copayment for that visit, as she stated the majority of the appointment was spent discussing issues related to scheduling her infusion. She had concerns about the cost and whether financial assistance would cover it, and felt she was unable to fully discuss her treatment plan in the event she could not receive the infusion.    Since that appointment, her follow-up has been rescheduled to May 1st, and she was able to receive her infusion at [facility].

## 2025-05-01 ENCOUNTER — TELEMEDICINE (OUTPATIENT)
Dept: NEUROLOGY | Age: 35
End: 2025-05-01

## 2025-05-01 DIAGNOSIS — Z79.899 MEDICATION MANAGEMENT: ICD-10-CM

## 2025-05-01 DIAGNOSIS — F44.7 FUNCTIONAL NEUROLOGICAL SYMPTOM DISORDER WITH MIXED SYMPTOMS: ICD-10-CM

## 2025-05-01 DIAGNOSIS — G43.709 CHRONIC MIGRAINE WITHOUT AURA WITHOUT STATUS MIGRAINOSUS, NOT INTRACTABLE: Primary | ICD-10-CM

## 2025-05-01 DIAGNOSIS — R11.0 NAUSEA: ICD-10-CM

## 2025-05-01 NOTE — PROGRESS NOTES
surgery on July 23.  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  While over the maternity leave her primary care to go over and switch her from Qulipta to Emgality.  She reports initial improvement with Emgality but it stopped working as well after she had surgery.  She reports 2-3 headache episodes per week.  She denies any change in headache character.  Seeing orthopedic surgeons for protracted recovery from left hip replacement.  Reports severe pain in her left knee/leg and inability to  her left foot.  She is planning to see orthopedic surgeons next month who were thinking about getting EMG/NCS of lower extremities.  I advised patient to let me know if she would EMG/NCS to be completed and I can set that up here.  Headache medicines which patient tried at this point include Nurtec, Aimovig, Ajovy, Qulipta, amitriptyline, Effexor, Topamax, Ubrelvy, Emgality     Patient is currently on Effexor 150 mg daily, Emgality, Ubrelvy.  She actually still takes Topamax 75 mg nightly.  I offered patient some slight adjustment in Topamax dosage where she will be taking 50 mg twice a day.  Patient was agreeable.        Interval History on 2/8/2024:  Chikis Villa is a 33 y.o. right-handed female who presents for follow-up management of chronic migraine headaches, TIA?, PNES, functional neurological disorder(chronic stuttering, right sided weakness/numbness).  Recently seen by different provider in the group. Today she is accompanied by her  and service dog.   She continues to have intractable migraine headaches. Per previous documentation, patient tried  Ubrelvy Nurtec Topamax Trokendi and Imitrex Elavil but to me patient and her  deny even trying Ubrelvy today. She wasn't able to afford Botox injection.   Current headache frequency at least 5-6 disabling headaches per month which can last for a few days at time. It is associated with severe nausea. She also takes Effexor 75 mg x

## 2025-07-17 ENCOUNTER — TELEPHONE (OUTPATIENT)
Dept: NEUROLOGY | Age: 35
End: 2025-07-17

## (undated) DEVICE — INSUFFLATION NEEDLE TO ESTABLISH PNEUMOPERITONEUM.: Brand: INSUFFLATION NEEDLE

## (undated) DEVICE — SEAL UNIV 5-8MM DISP BX/10 -- DA VINCI XI - SNGL USE

## (undated) DEVICE — VISUALIZATION SYSTEM: Brand: CLEARIFY

## (undated) DEVICE — BLLN OCCL PERITONM COLPOPNEUMO --

## (undated) DEVICE — CARDINAL HEALTH FLEXIBLE LIGHT HANDLE COVER: Brand: CARDINAL HEALTH

## (undated) DEVICE — ROBOTIC HYSTERECTOMY: Brand: MEDLINE INDUSTRIES, INC.

## (undated) DEVICE — 2, DISPOSABLE SUCTION/IRRIGATOR WITHOUT DISPOSABLE TIP: Brand: STRYKEFLOW

## (undated) DEVICE — BLADELESS OBTURATOR: Brand: WECK VISTA

## (undated) DEVICE — COLUMN DRAPE

## (undated) DEVICE — SPONGE LAP 18X18IN STRL -- 5/PK

## (undated) DEVICE — COVER,TABLE,44X76,STERILE: Brand: MEDLINE

## (undated) DEVICE — VESSEL SEALER XI EXTENDED DISP -- VESSEL

## (undated) DEVICE — CONTROL SYRINGE LUER-LOCK TIP: Brand: MONOJECT

## (undated) DEVICE — AGENT HEMSTAT 5GM ARISTA AH

## (undated) DEVICE — APPLICATOR SURG XL L38CM FOR ARISTA ABSRB HEMSTAT FLEXITIP

## (undated) DEVICE — LEGGINGS, PAIR, 31X48, STERILE: Brand: MEDLINE

## (undated) DEVICE — SYR 10ML LUER LOK 1/5ML GRAD --

## (undated) DEVICE — NEEDLE HYPO 21GA L1.5IN INTRAMUSCULAR S STL LATCH BVL UP

## (undated) DEVICE — SOLUTION IRRIG 1000ML H2O STRL BLT

## (undated) DEVICE — SCISSORS SURG DIA8MM MPLR CRV ENDOWRIST

## (undated) DEVICE — SOLUTION IRRIGATION H2O 0797305] ICU MEDICAL INC]

## (undated) DEVICE — FILTER SMK EVAC FLO CLR MEGADYNE

## (undated) DEVICE — GOWN,REINF,POLY,ECL,PP SLV,XL: Brand: MEDLINE

## (undated) DEVICE — CATHETER URETH 16FR BLLN 5CC SIL ALLY W/ SIL HYDRGEL 2 W F

## (undated) DEVICE — DERMABOND SKIN ADH 0.7ML -- DERMABOND ADVANCED 12/BX

## (undated) DEVICE — SYRINGE IRRIG 60ML SFT PLIABLE BLB EZ TO GRP 1 HND USE W/

## (undated) DEVICE — DRAPE,TOP,102X53,STERILE: Brand: MEDLINE

## (undated) DEVICE — AIRSEAL 8 MM ACCESS PORT AND LOW PROFILE OBTURATOR WITH BLADELESS OPTICAL TIP, 120 MM LENGTH: Brand: AIRSEAL

## (undated) DEVICE — REM POLYHESIVE ADULT PATIENT RETURN ELECTRODE: Brand: VALLEYLAB

## (undated) DEVICE — COBRA GRASPER: Brand: ENDOWRIST

## (undated) DEVICE — TRAY PREP DRY W/ PREM GLV 2 APPL 6 SPNG 2 UNDPD 1 OVERWRAP

## (undated) DEVICE — APPLICATOR COT-TIP 6IN WOOD -- 2/PK STRL

## (undated) DEVICE — SUTURE V-LOC 180 SZ 0 L9IN ABSRB GRN GS-21 L37MM 1/2 CIR VLOCL0346

## (undated) DEVICE — SYR 50ML LR LCK 1ML GRAD NSAF --

## (undated) DEVICE — SUTURE ABSORBABLE BRAIDED 0 CT-1 8X27 IN UD VICRYL JJ41G

## (undated) DEVICE — SUTURE COAT VCRL SZ 4-0 L18IN ABSRB UD L19MM PS-2 1/2 CIR J496G

## (undated) DEVICE — SOLUTION IRRIG 3000ML 0.9% SOD CHL FLX CONT 0797208] ICU MEDICAL INC]

## (undated) DEVICE — GARMENT,MEDLINE,DVT,INT,CALF,MED, GEN2: Brand: MEDLINE

## (undated) DEVICE — DRAPE,UNDERBUTTOCKS,PCH,STERILE: Brand: MEDLINE

## (undated) DEVICE — ARM DRAPE

## (undated) DEVICE — (D)PREP SKN CHLRAPRP APPL 26ML -- CONVERT TO ITEM 371833

## (undated) DEVICE — DRAPE TWL SURG 16X26IN BLU ORB04] ALLCARE INC]

## (undated) DEVICE — CYSTO/BLADDER IRRIGATION SET, REGULATING CLAMP

## (undated) DEVICE — BASIC SINGLE BASIN-LF: Brand: MEDLINE INDUSTRIES, INC.

## (undated) DEVICE — CONTAINER SPEC HISTOLOGY 900ML POLYPR

## (undated) DEVICE — PAD MATERNITY 11IN W/TAILS -- STRL

## (undated) DEVICE — TIP COVER ACCESSORY

## (undated) DEVICE — MEGA SUTURECUT ND: Brand: ENDOWRIST

## (undated) DEVICE — 2000CC GUARDIAN II: Brand: GUARDIAN

## (undated) DEVICE — JELLY LUBRICATING 10GM PREFIL SYR LUBE

## (undated) DEVICE — ELECTRO LUBE IS A SINGLE PATIENT USE DEVICE THAT IS INTENDED TO BE USED ON ELECTROSURGICAL ELECTRODES TO REDUCE STICKING.: Brand: KEY SURGICAL ELECTRO LUBE

## (undated) DEVICE — 40585 XL ADVANCED TRENDELENBURG POSITIONING KIT: Brand: 40585 XL ADVANCED TRENDELENBURG POSITIONING KIT

## (undated) DEVICE — TRI-LUMEN FILTERED TUBE SET WITH ACTIVATED CHARCOAL FILTER: Brand: AIRSEAL

## (undated) DEVICE — BAG DRNGE 4000ML CONT IRRIG ROUNDED TEARDROP SHP DISP